# Patient Record
Sex: MALE | Race: WHITE | NOT HISPANIC OR LATINO | Employment: OTHER | URBAN - METROPOLITAN AREA
[De-identification: names, ages, dates, MRNs, and addresses within clinical notes are randomized per-mention and may not be internally consistent; named-entity substitution may affect disease eponyms.]

---

## 2018-01-29 ENCOUNTER — TELEPHONE (OUTPATIENT)
Dept: PAIN MEDICINE | Facility: CLINIC | Age: 73
End: 2018-01-29

## 2018-01-29 NOTE — TELEPHONE ENCOUNTER
Pt called  Stated Dr Tamiko Washington sent over paperwork for him to have injections  Pt not in system and was added  [facility]

## 2018-02-02 NOTE — TELEPHONE ENCOUNTER
Wife called inquiring about setting up an appt  I gave her the office fax number to have the order and office notes sent directly to the office

## 2018-02-05 NOTE — TELEPHONE ENCOUNTER
S/w pt  He is scheduled for 2/20/18 @ 9:45 w/Dr Kathia Tapia  New pt paperwork has been mailed to the pt

## 2018-02-14 ENCOUNTER — TRANSCRIBE ORDERS (OUTPATIENT)
Dept: PAIN MEDICINE | Facility: CLINIC | Age: 73
End: 2018-02-14

## 2018-02-14 DIAGNOSIS — M54.5 LOW BACK PAIN, UNSPECIFIED BACK PAIN LATERALITY, UNSPECIFIED CHRONICITY, WITH SCIATICA PRESENCE UNSPECIFIED: Primary | ICD-10-CM

## 2018-02-21 ENCOUNTER — CONSULT (OUTPATIENT)
Dept: PAIN MEDICINE | Facility: CLINIC | Age: 73
End: 2018-02-21
Payer: MEDICARE

## 2018-02-21 VITALS
TEMPERATURE: 98 F | HEIGHT: 72 IN | DIASTOLIC BLOOD PRESSURE: 80 MMHG | WEIGHT: 243 LBS | SYSTOLIC BLOOD PRESSURE: 132 MMHG | BODY MASS INDEX: 32.91 KG/M2 | HEART RATE: 72 BPM

## 2018-02-21 DIAGNOSIS — M54.50 CHRONIC BILATERAL LOW BACK PAIN WITHOUT SCIATICA: ICD-10-CM

## 2018-02-21 DIAGNOSIS — M48.061 SPINAL STENOSIS OF LUMBAR REGION WITHOUT NEUROGENIC CLAUDICATION: Primary | ICD-10-CM

## 2018-02-21 DIAGNOSIS — G89.29 CHRONIC BILATERAL LOW BACK PAIN WITHOUT SCIATICA: ICD-10-CM

## 2018-02-21 PROCEDURE — 99204 OFFICE O/P NEW MOD 45 MIN: CPT | Performed by: ANESTHESIOLOGY

## 2018-02-21 RX ORDER — LISINOPRIL 10 MG/1
10 TABLET ORAL 2 TIMES DAILY
Refills: 3 | COMMUNITY
Start: 2018-02-13

## 2018-02-21 RX ORDER — HYDROXYCHLOROQUINE SULFATE 200 MG/1
200 TABLET, FILM COATED ORAL 2 TIMES DAILY WITH MEALS
COMMUNITY
End: 2022-05-31 | Stop reason: SDUPTHER

## 2018-02-21 RX ORDER — SULFASALAZINE 500 MG/1
500 TABLET ORAL
COMMUNITY
End: 2022-05-31 | Stop reason: SDUPTHER

## 2018-02-21 RX ORDER — ATORVASTATIN CALCIUM 40 MG/1
40 TABLET, FILM COATED ORAL DAILY
COMMUNITY

## 2018-02-21 RX ORDER — ASPIRIN 81 MG/1
81 TABLET ORAL DAILY
COMMUNITY

## 2018-02-21 RX ORDER — MELOXICAM 15 MG/1
15 TABLET ORAL DAILY
Refills: 0 | COMMUNITY
Start: 2018-02-02

## 2018-02-21 RX ORDER — FEXOFENADINE HCL 180 MG/1
180 TABLET ORAL DAILY
COMMUNITY

## 2018-02-21 RX ORDER — PROPRANOLOL/HYDROCHLOROTHIAZID 40 MG-25MG
1000 TABLET ORAL 2 TIMES DAILY
COMMUNITY

## 2018-02-21 RX ORDER — MULTIVITAMIN
1 TABLET ORAL DAILY
COMMUNITY

## 2018-02-21 NOTE — PROGRESS NOTES
Assessment:  1  Spinal stenosis of lumbar region without neurogenic claudication    2  Chronic bilateral low back pain without sciatica        Plan:  An extremely long and detailed conversation was held with the patient and his wife was in attendance  Patient has longstanding history of low back pain which is significantly worsening right he has a flare up every 3 months that leave him non functioning for approximately 1 week  I thoroughly reviewed his MRI and he does have severe stenosis secondary to disc herniation  We discussed the utility of lumbar epidural steroid injections to address any inflammatory component of his pain  However at this time secondary to the severity of his stenosis I would like him to undergo surgical evaluation prior to epidural steroid injections  Patient is quite comfortable to [facility] and will have him evaluated by Dr Elmer Ordonez  In the interim, if he does have an Neck acute episode of extreme pain I have asked him to give our office a call  The patient has the current Goals: Decreased pain and increased function  The patent has the current Barriers:  Lumbar spinal stenosis    Patient is able to Self-Care    The treatment plan was reviewed with the patient  The patient understands and agrees with the treatment plan   The patient  was counseled regarding instructions for management, risk factor reductions, prognosis, patient and family education, risks and benefits of treatment options and importance of compliance with treatment  My impressions and treatment recommendations were discussed in detail with the patient who verbalized understanding and had no further questions  Discharge instructions were provided  I personally saw and examined the patient and I agree with the above discussed plan of care      Orders Placed This Encounter   Procedures    Ambulatory referral to Orthopedic Surgery     Referral Priority:   Routine     Referral Type:   Consult - AMB     Referral Reason:   Specialty Services Required     Referred to Provider:   Hever Bustos MD     Requested Specialty:   Orthopedic Surgery     Number of Visits Requested:   1     Expiration Date:   2019     New Medications Ordered This Visit   Medications    lisinopril (ZESTRIL) 10 mg tablet     Sig: Take 10 mg by mouth 2 (two) times a day     Refill:  3    meloxicam (MOBIC) 15 mg tablet     Sig: Take 15 mg by mouth daily     Refill:  0    aspirin (ECOTRIN LOW STRENGTH) 81 mg EC tablet     Sig: Take 81 mg by mouth daily    atorvastatin (LIPITOR) 40 mg tablet     Sig: Take 40 mg by mouth daily    Cholecalciferol 2000 units TBDP     Sig: Take by mouth    fexofenadine (ALLEGRA) 180 MG tablet     Sig: Take 180 mg by mouth daily    hydroxychloroquine (PLAQUENIL) 200 mg tablet     Sig: Take 200 mg by mouth 2 (two) times a day with meals    Multiple Vitamin (MULTIVITAMIN) tablet     Sig: Take 1 tablet by mouth daily    sodium chloride (OCEAN) 0 65 % nasal spray     Si spray into each nostril as needed    sulfaSALAzine (AZULFIDINE) 500 mg tablet     Sig: Take by mouth 4 (four) times a day    Turmeric 500 MG CAPS     Sig: Take by mouth       History of Present Illness:    Juju Ley is a 71 y o  male who has a 5 year history of low back pain  Initially an injury at work in 2009  His symptoms are moderate to severe rates as 10/10 on the visual analog scale almost completely interfering with his daily living activities  He denies any weakness of his lower extremities describes the pain as shooting sharp and cutting when it occurs noted that lying down standing in relaxation decreases symptoms will bending, sitting and walking aggravate them    He has undergone physical therapy and chiropractic treatment in the past     I have personally reviewed and/or updated the patient's past medical history, past surgical history, family history, social history, current medications, allergies, and vital signs today  Review of Systems:    Review of Systems   Respiratory: Negative for shortness of breath  Cardiovascular: Negative for chest pain  Gastrointestinal: Negative for constipation, diarrhea, nausea and vomiting  Musculoskeletal: Positive for gait problem  Negative for arthralgias, joint swelling (joint stiffness) and myalgias  Skin: Negative for rash  Neurological: Negative for dizziness, seizures and weakness  All other systems reviewed and are negative  There is no problem list on file for this patient  Past Medical History:   Diagnosis Date    Arthritis     Hypertension        No past surgical history on file  Family History   Problem Relation Age of Onset    Heart disease Family     Diabetes Family        Social History     Occupational History    Not on file  Social History Main Topics    Smoking status: Never Smoker    Smokeless tobacco: Never Used    Alcohol use Yes      Comment: 1-2 per day    Drug use: No    Sexual activity: Not on file       No current outpatient prescriptions on file prior to visit  No current facility-administered medications on file prior to visit  Allergies no known allergies    Physical Exam:    /80   Pulse 72   Temp 98 °F (36 7 °C)   Ht 6' (1 829 m)   Wt 110 kg (243 lb)   BMI 32 96 kg/m²     Constitutional: normal, well developed, well nourished, alert, in no distress and non-toxic and no overt pain behavior  Overweight  Eyes: anicteric  HEENT: grossly intact  Neck: supple, symmetric, trachea midline and no masses   Pulmonary:even and unlabored  Cardiovascular:No edema or pitting edema present  Skin:Normal without rashes or lesions and well hydrated  Psychiatric:Mood and affect appropriate  Neurologic:Cranial Nerves II-XII grossly intact  Musculoskeletal:normal   Inspection:  Normal station and gait  Normal lumbar lordotic curve with no significant scoliosis or spinal step-off    Skin intact without erythema  No gross mass or muscle atrophy  Palpation:  There is no tenderness to palpation overlying the sacroiliac joint as well as the ischial bursa bilateral   No significant tenderness over the greater trochanteric bursa bilaterally  Motor/Strength:  5/5 strength in the bilateral lower limbs  The patient is able to heel and/toe walk  Tandem gait is intact  Reflexes: Deep tendon reflex are 2+ and symmetrical bilateral patella and Achilles  Sensation:   Sensation intact to light touch and pinprick in the bilateral lower limbs  Proprioception is intact at bilateral hallux  Maneuvers: Negative bilateral straight leg raising  Negative Steve's maneuver  Imaging  MRI LUMBAR 1-25-18  Severe central stenosis at L2-3, sedoncdary to a large central midline disc extrusion  Mild central stenosis at L3-4 and L4-5  Mild retrolisthesis at L2-3    I have personally reviewed pertinent films in PACS

## 2021-04-08 ENCOUNTER — TELEPHONE (OUTPATIENT)
Dept: INTERNAL MEDICINE CLINIC | Facility: CLINIC | Age: 76
End: 2021-04-08

## 2021-04-08 DIAGNOSIS — Z23 ENCOUNTER FOR IMMUNIZATION: ICD-10-CM

## 2022-05-31 ENCOUNTER — OFFICE VISIT (OUTPATIENT)
Dept: RHEUMATOLOGY | Facility: CLINIC | Age: 77
End: 2022-05-31

## 2022-05-31 VITALS
WEIGHT: 233 LBS | DIASTOLIC BLOOD PRESSURE: 76 MMHG | SYSTOLIC BLOOD PRESSURE: 147 MMHG | HEIGHT: 72 IN | BODY MASS INDEX: 31.56 KG/M2

## 2022-05-31 DIAGNOSIS — M06.9 RHEUMATOID ARTHRITIS, INVOLVING UNSPECIFIED SITE, UNSPECIFIED WHETHER RHEUMATOID FACTOR PRESENT (HCC): Primary | ICD-10-CM

## 2022-05-31 DIAGNOSIS — Z79.899 HIGH RISK MEDICATION USE: ICD-10-CM

## 2022-05-31 PROCEDURE — 99204 OFFICE O/P NEW MOD 45 MIN: CPT | Performed by: INTERNAL MEDICINE

## 2022-05-31 RX ORDER — CARBOXYMETHYLCELLULOSE SODIUM 5 MG/ML
SOLUTION/ DROPS OPHTHALMIC
COMMUNITY
Start: 2022-05-19

## 2022-05-31 RX ORDER — FLUTICASONE PROPIONATE 50 MCG
SPRAY, SUSPENSION (ML) NASAL
COMMUNITY
Start: 2022-05-19

## 2022-05-31 RX ORDER — AMLODIPINE BESYLATE 5 MG/1
5 TABLET ORAL
COMMUNITY
Start: 2022-05-19

## 2022-05-31 RX ORDER — SULFASALAZINE 500 MG/1
1500 TABLET ORAL 2 TIMES DAILY
Qty: 360 TABLET | Refills: 1 | Status: SHIPPED | OUTPATIENT
Start: 2022-05-31

## 2022-05-31 RX ORDER — HYDROXYCHLOROQUINE SULFATE 200 MG/1
200 TABLET, FILM COATED ORAL 2 TIMES DAILY WITH MEALS
Qty: 180 TABLET | Refills: 1 | Status: SHIPPED | OUTPATIENT
Start: 2022-05-31 | End: 2022-11-27

## 2022-05-31 NOTE — PATIENT INSTRUCTIONS
Continue sulfasalazine 3 tabs twice a day  Continue hydroxychloroquine 1 tab twice a day  Continue regular eye exams  Do labs before next visit    Return to clinic in 6 months

## 2022-05-31 NOTE — PROGRESS NOTES
Assessment and Plan:   Rai Rodriguez is a 77 y o   male who presents as a Rheumatology consult referred by Jeffery Cortes DO to establish care for Rheumatoid arthritis, under control with SSZ 1,500mg po bid and HCQ 200mg po bid; gets regular eye exams  Medications continued  Do labs before next visit  Continue sulfasalazine 3 tabs twice a day  Continue hydroxychloroquine 1 tab twice a day  Continue regular eye exams  Do labs before next visit    Return to clinic in 6 months    Plan:  Diagnoses and all orders for this visit:    Rheumatoid arthritis, involving unspecified site, unspecified whether rheumatoid factor present (HCC)  -     sulfaSALAzine (AZULFIDINE) 500 mg tablet; Take 3 tablets (1,500 mg total) by mouth 2 (two) times a day  -     hydroxychloroquine (PLAQUENIL) 200 mg tablet; Take 1 tablet (200 mg total) by mouth 2 (two) times a day with meals  -     CBC and differential  -     Comprehensive metabolic panel  -     C-reactive protein  -     Sedimentation rate, automated  -     RF Screen w/ Reflex to Titer  -     Cyclic citrul peptide antibody, IgG    High risk medication use  -     CBC and differential  -     Comprehensive metabolic panel    Other orders  -     amLODIPine (NORVASC) 5 mg tablet; Take 5 mg by mouth  -     carboxymethylcellulose (REFRESH PLUS) 0 5 % SOLN; Apply to eye  -     fluticasone (FLONASE) 50 mcg/act nasal spray; into each nostril  High risk medication - Benefits and risks of hydroxychloroquine, including but not limited to retinal toxicity, corneal deposits, gastrointestinal side effects, and headaches were discussed with the patient  The need for a regular eye exam to monitor for ocular toxicity while on this medication was also explained to the patient  Follow-up plan: RTC in 6 months        HPI  Rai Rodriguez is a 77 y o   male who presents as a Rheumatology consult referred by Jeffery Cortes DO to establish care for Rheumatoid arthritis   Transferring care from [facility]  Was diagnosed 18 years ago by a private rheumatologist in [facility]; only prescribed Aleve, helped a little  He then transferred to the [address] [place] [address] in [place]  Original symptoms - had weakness in legs, joint pain and stiffness was worse in the morning  Hand symptoms (especially swelling) started a few months later; worse with activity  Started seeing a fellow at the [address] [place] [address]; started on sulfasalazine (2,000-3,000mg a day) and methotrexate  Took some time to calm things down  Currently on SSZ 3,000mg daily; was on methotrexate for 4 years, doesn't remember why it was stopped  Was never on prednisone  Currently on sulfasalazine, Vit  D3, calcium, atorvastatin, and hydroxychloroquine prescribed by rheumatologist  Chichi Dee his disease is under control  Started taking turmeric bid; has made a big difference  Takes ibuprofen prn (600mg every few months)  Last eye exam was last week; stable  Review of Systems  Review of Systems   Constitutional: Negative for fatigue, fever and unexpected weight change  HENT: Negative for mouth sores  Respiratory: Positive for cough  Negative for shortness of breath  Cardiovascular: Negative for chest pain and leg swelling  Gastrointestinal: Negative for abdominal pain, constipation and diarrhea  Musculoskeletal: Positive for arthralgias, joint swelling and myalgias  Negative for back pain  Skin: Negative for color change and rash  Allergic/Immunologic: Positive for environmental allergies  Neurological: Negative for weakness  Hematological: Negative for adenopathy  Bruises/bleeds easily  Psychiatric/Behavioral: Negative for sleep disturbance  Reviewed and agree      Allergies  No Known Allergies    Home Medications    Current Outpatient Medications:     amLODIPine (NORVASC) 5 mg tablet, Take 5 mg by mouth, Disp: , Rfl:     aspirin (ECOTRIN LOW STRENGTH) 81 mg EC tablet, Take 81 mg by mouth daily, Disp: , Rfl:     atorvastatin (LIPITOR) 40 mg tablet, Take 40 mg by mouth daily, Disp: , Rfl:     carboxymethylcellulose (REFRESH PLUS) 0 5 % SOLN, Apply to eye, Disp: , Rfl:     Cholecalciferol 2000 units TBDP, Take by mouth, Disp: , Rfl:     fexofenadine (ALLEGRA) 180 MG tablet, Take 180 mg by mouth daily, Disp: , Rfl:     fluticasone (FLONASE) 50 mcg/act nasal spray, into each nostril, Disp: , Rfl:     hydroxychloroquine (PLAQUENIL) 200 mg tablet, Take 1 tablet (200 mg total) by mouth 2 (two) times a day with meals, Disp: 180 tablet, Rfl: 1    lisinopril (ZESTRIL) 10 mg tablet, Take 10 mg by mouth 2 (two) times a day, Disp: , Rfl: 3    meloxicam (MOBIC) 15 mg tablet, Take 15 mg by mouth daily, Disp: , Rfl: 0    Multiple Vitamin (MULTIVITAMIN) tablet, Take 1 tablet by mouth daily, Disp: , Rfl:     sodium chloride (OCEAN) 0 65 % nasal spray, 1 spray into each nostril as needed, Disp: , Rfl:     sulfaSALAzine (AZULFIDINE) 500 mg tablet, Take 3 tablets (1,500 mg total) by mouth 2 (two) times a day, Disp: 360 tablet, Rfl: 1    Turmeric 500 MG CAPS, Take 1,000 capsules by mouth 2 (two) times a day, Disp: , Rfl:     Past Medical History  Past Medical History:   Diagnosis Date    Arthritis     Hypertension        Past Surgical History   History reviewed  No pertinent surgical history      Family History    Family History   Problem Relation Age of Onset    Heart disease Family     Diabetes Family    sister - RA      Social History  Occupation: retired; worked in construction, 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   Social History     Substance and Sexual Activity   Alcohol Use Yes    Alcohol/week: 1 0 standard drink    Types: 1 Standard drinks or equivalent per week    Comment: 1-2 per day     Social History     Substance and Sexual Activity   Drug Use No     Social History     Tobacco Use   Smoking Status Former Smoker    Quit date:     Years since quittin 4   Smokeless Tobacco Never Used       Objective:  Vitals:    22 0819   BP: 147/76 Weight: 106 kg (233 lb)   Height: 6' (1 829 m)       Physical Exam  Constitutional:       General: He is not in acute distress  HENT:      Head: Normocephalic and atraumatic  Eyes:      Conjunctiva/sclera: Conjunctivae normal    Cardiovascular:      Rate and Rhythm: Normal rate and regular rhythm  Heart sounds: S1 normal and S2 normal      No friction rub  Pulmonary:      Effort: Pulmonary effort is normal  No respiratory distress  Breath sounds: Normal breath sounds  No wheezing, rhonchi or rales  Musculoskeletal:         General: No swelling or tenderness  Cervical back: Neck supple  Skin:     General: Skin is warm and dry  Coloration: Skin is not pale  Findings: No rash  Neurological:      Mental Status: He is alert  Mental status is at baseline  Psychiatric:         Mood and Affect: Mood normal          Behavior: Behavior normal          Reviewed labs and imaging      Imaging:   MRI Lumbar Spine 1/25/18  Impression:  1  severe centrasl stenosis at L2-3L3, secondary to large cental midline disc extrusion  2  midl central stenosis at L3-L4 and L4-L5  3  mild retrolisthesis at L2-L3    Labs:   Outside labs from 4/2022 - unremarkable CBC and CMP

## 2022-08-11 ENCOUNTER — TELEPHONE (OUTPATIENT)
Dept: INTERNAL MEDICINE CLINIC | Facility: CLINIC | Age: 77
End: 2022-08-11

## 2022-08-15 ENCOUNTER — TELEPHONE (OUTPATIENT)
Dept: INTERNAL MEDICINE CLINIC | Facility: CLINIC | Age: 77
End: 2022-08-15

## 2022-11-28 NOTE — PROGRESS NOTES
Assessment and Plan: Estefany Rodriges is a 69 y o  male who presents for follow-up of his seropositive RA, which continues to be under control with SSZ 1,500mg po bid and HCQ 200mg po bid; gets regular eye exams      Continue sulfasalazine 3 tabs twice a day  Continue hydroxychloroquine 1 tab twice a day  Continue regular eye exams  Do labs before next visit     Return to clinic in 6 months    Plan:  Diagnoses and all orders for this visit:    Rheumatoid arthritis, involving unspecified site, unspecified whether rheumatoid factor present (HCC)  -     sulfaSALAzine (AZULFIDINE) 500 mg tablet; Take 3 tablets (1,500 mg total) by mouth 2 (two) times a day  -     hydroxychloroquine (PLAQUENIL) 200 mg tablet; Take 1 tablet (200 mg total) by mouth in the morning  -     CBC and differential  -     Comprehensive metabolic panel  -     C-reactive protein  -     Sedimentation rate, automated    High risk medication use  -     CBC and differential  -     Comprehensive metabolic panel  High risk medication use - Benefits and risks of hydroxychloroquine, including but not limited to retinal toxicity, corneal deposits, gastrointestinal side effects, and headaches were discussed with the patient  The need for a regular eye exam to monitor for ocular toxicity while on this medication was also explained to the patient  Follow-up plan: RTC in 6 months        Rheumatic Disease Summary  5/31/22: Do labs before next visit  Continue sulfasalazine 3 tabs twice a day  Continue hydroxychloroquine 1 tab twice a day  Continue regular eye exams  Do labs before next visit  HPI  Estefany Rodriges is a 69 y o   male who presents for follow up of seropositive RA  His last flare-up was 2 months ago; took ibuprofen for that  Last eye exam was in May; has some trouble with blurry vision  Has 15-20 minutes of morning stiffness  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, which is beinf further worked up  Swelling in fingers, hands and elbows   Using hammer, holding onto something tightly for an extended period of time, major swelling occurs  In April, couldn't get sulfasalazine for 2 weeks but felt fine when he wasn't on it  Will be out of sulfasalazine tomorrow, so needs refill  Has only been taking 2000 mg daily instead of 3000 mg so that he could make it last longer  Joints aren't hot or red except for during a flare  Stiffness in the morning lasting 15-20 minutes  Just saw cardiology and everything is good on their end  Goes to the [place] doctor in 1 week to go over results from a lung scan done in November  The following portions of the patient's history were reviewed and updated as appropriate: allergies, current medications, past family history, past medical history, past social history, past surgical history and problem list     Review of Systems:   Review of Systems   Constitutional: Negative for fatigue  HENT: Negative for mouth sores  Eyes: Negative for pain  Respiratory: Negative for shortness of breath  Cardiovascular: Negative for leg swelling  Musculoskeletal: Positive for arthralgias  Negative for joint swelling  Skin: Negative for rash  Neurological: Negative for weakness  Hematological: Negative for adenopathy  Psychiatric/Behavioral: Negative for sleep disturbance  Reviewed and agree      Home Medications:    Current Outpatient Medications:   •  atorvastatin (LIPITOR) 40 mg tablet, Take 20 mg by mouth daily, Disp: , Rfl:   •  hydroxychloroquine (PLAQUENIL) 200 mg tablet, Take 1 tablet (200 mg total) by mouth in the morning, Disp: 90 tablet, Rfl: 2  •  sulfaSALAzine (AZULFIDINE) 500 mg tablet, Take 3 tablets (1,500 mg total) by mouth 2 (two) times a day, Disp: 540 tablet, Rfl: 2  •  amLODIPine (NORVASC) 5 mg tablet, Take 5 mg by mouth 2 (two) times a day, Disp: , Rfl:   •  aspirin (ECOTRIN LOW STRENGTH) 81 mg EC tablet, Take 81 mg by mouth daily, Disp: , Rfl:   •  carboxymethylcellulose (REFRESH PLUS) 0 5 % SOLN, Apply to eye, Disp: , Rfl:   •  Cholecalciferol 2000 units TBDP, Take by mouth, Disp: , Rfl:   •  fexofenadine (ALLEGRA) 180 MG tablet, Take 180 mg by mouth daily, Disp: , Rfl:   •  fluticasone (FLONASE) 50 mcg/act nasal spray, into each nostril, Disp: , Rfl:   •  lisinopril (ZESTRIL) 10 mg tablet, Take 20 mg by mouth 2 (two) times a day, Disp: , Rfl: 3  •  Multiple Vitamin (MULTIVITAMIN) tablet, Take 1 tablet by mouth daily, Disp: , Rfl:   •  sodium chloride (OCEAN) 0 65 % nasal spray, 1 spray into each nostril as needed, Disp: , Rfl:   •  Turmeric 500 MG CAPS, Take 1,000 capsules by mouth 2 (two) times a day, Disp: , Rfl:     Objective:    Vitals:    12/01/22 0943   BP: 142/74   Weight: 110 kg (243 lb)   Height: 6' (1 829 m)       Physical Exam  Constitutional:       General: He is not in acute distress  HENT:      Head: Normocephalic and atraumatic  Eyes:      Conjunctiva/sclera: Conjunctivae normal    Cardiovascular:      Rate and Rhythm: Normal rate and regular rhythm  Heart sounds: S1 normal and S2 normal      No friction rub  Pulmonary:      Effort: Pulmonary effort is normal  No respiratory distress  Breath sounds: Normal breath sounds  No wheezing, rhonchi or rales  Musculoskeletal:         General: No tenderness  Cervical back: Neck supple  Skin:     Coloration: Skin is not pale  Neurological:      Mental Status: He is alert  Mental status is at baseline  Psychiatric:         Mood and Affect: Mood normal          Behavior: Behavior normal        Imaging:   No results found  Labs:   No visits with results within 6 Month(s) from this visit  Latest known visit with results is:   No results found for any previous visit

## 2022-12-01 ENCOUNTER — OFFICE VISIT (OUTPATIENT)
Dept: RHEUMATOLOGY | Facility: CLINIC | Age: 77
End: 2022-12-01

## 2022-12-01 VITALS
SYSTOLIC BLOOD PRESSURE: 142 MMHG | WEIGHT: 243 LBS | HEIGHT: 72 IN | BODY MASS INDEX: 32.91 KG/M2 | DIASTOLIC BLOOD PRESSURE: 74 MMHG

## 2022-12-01 DIAGNOSIS — M06.9 RHEUMATOID ARTHRITIS, INVOLVING UNSPECIFIED SITE, UNSPECIFIED WHETHER RHEUMATOID FACTOR PRESENT (HCC): Primary | ICD-10-CM

## 2022-12-01 DIAGNOSIS — Z79.899 HIGH RISK MEDICATION USE: ICD-10-CM

## 2022-12-01 RX ORDER — SULFASALAZINE 500 MG/1
1500 TABLET ORAL 2 TIMES DAILY
Qty: 540 TABLET | Refills: 2 | Status: SHIPPED | OUTPATIENT
Start: 2022-12-01 | End: 2022-12-07 | Stop reason: SDUPTHER

## 2022-12-01 RX ORDER — HYDROXYCHLOROQUINE SULFATE 200 MG/1
200 TABLET, FILM COATED ORAL DAILY
Qty: 90 TABLET | Refills: 2 | Status: SHIPPED | OUTPATIENT
Start: 2022-12-01 | End: 2023-08-28

## 2022-12-01 NOTE — PATIENT INSTRUCTIONS
Continue sulfasalazine 3 tabs twice a day  Continue hydroxychloroquine 1 tab daily for now  Continue regular eye exams  Do labs before next visit     Return to clinic in 6 months

## 2022-12-08 DIAGNOSIS — M06.9 RHEUMATOID ARTHRITIS, INVOLVING UNSPECIFIED SITE, UNSPECIFIED WHETHER RHEUMATOID FACTOR PRESENT (HCC): ICD-10-CM

## 2022-12-08 DIAGNOSIS — M06.9 RHEUMATOID ARTHRITIS, INVOLVING UNSPECIFIED SITE, UNSPECIFIED WHETHER RHEUMATOID FACTOR PRESENT (HCC): Primary | ICD-10-CM

## 2022-12-08 RX ORDER — SULFASALAZINE 500 MG/1
1500 TABLET ORAL 2 TIMES DAILY
Qty: 84 TABLET | Refills: 0 | Status: SHIPPED | OUTPATIENT
Start: 2022-12-08

## 2022-12-08 RX ORDER — SULFASALAZINE 500 MG/1
1500 TABLET ORAL 2 TIMES DAILY
Qty: 540 TABLET | Refills: 2 | Status: SHIPPED | OUTPATIENT
Start: 2022-12-08

## 2022-12-08 RX ORDER — SULFASALAZINE 500 MG/1
1500 TABLET ORAL 2 TIMES DAILY
Qty: 540 TABLET | Refills: 2 | Status: SHIPPED | OUTPATIENT
Start: 2022-12-08 | End: 2022-12-08 | Stop reason: SDUPTHER

## 2022-12-25 DIAGNOSIS — M06.9 RHEUMATOID ARTHRITIS, INVOLVING UNSPECIFIED SITE, UNSPECIFIED WHETHER RHEUMATOID FACTOR PRESENT (HCC): ICD-10-CM

## 2022-12-25 RX ORDER — SULFASALAZINE 500 MG/1
1500 TABLET ORAL 2 TIMES DAILY
Qty: 540 TABLET | Refills: 3 | Status: SHIPPED | OUTPATIENT
Start: 2022-12-25

## 2023-01-30 NOTE — TELEPHONE ENCOUNTER
Caller: Law Blazing at [address]: Jeann[facility] Precise    Reason for call:     Law Bell confirming appointment, confirmed fax too      Call back#:

## 2023-02-03 ENCOUNTER — HOSPITAL ENCOUNTER (OUTPATIENT)
Dept: CT IMAGING | Facility: HOSPITAL | Age: 78
Discharge: HOME | End: 2023-02-03

## 2023-02-03 DIAGNOSIS — D35.00 BENIGN NEOPLASM OF ADRENAL GLAND, UNSPECIFIED LATERALITY: ICD-10-CM

## 2023-02-27 DIAGNOSIS — M06.9 RHEUMATOID ARTHRITIS, INVOLVING UNSPECIFIED SITE, UNSPECIFIED WHETHER RHEUMATOID FACTOR PRESENT (HCC): ICD-10-CM

## 2023-02-27 RX ORDER — HYDROXYCHLOROQUINE SULFATE 200 MG/1
200 TABLET, FILM COATED ORAL DAILY
Qty: 90 TABLET | Refills: 0 | Status: SHIPPED | OUTPATIENT
Start: 2023-02-27 | End: 2023-11-24

## 2023-05-30 DIAGNOSIS — M06.9 RHEUMATOID ARTHRITIS, INVOLVING UNSPECIFIED SITE, UNSPECIFIED WHETHER RHEUMATOID FACTOR PRESENT (HCC): ICD-10-CM

## 2023-05-30 RX ORDER — HYDROXYCHLOROQUINE SULFATE 200 MG/1
200 TABLET, FILM COATED ORAL DAILY
Qty: 90 TABLET | Refills: 0 | Status: SHIPPED | OUTPATIENT
Start: 2023-05-30 | End: 2024-02-24

## 2023-07-11 ENCOUNTER — OFFICE VISIT (OUTPATIENT)
Dept: RHEUMATOLOGY | Facility: CLINIC | Age: 78
End: 2023-07-11
Payer: MEDICARE

## 2023-07-11 VITALS
DIASTOLIC BLOOD PRESSURE: 68 MMHG | HEIGHT: 72 IN | SYSTOLIC BLOOD PRESSURE: 128 MMHG | BODY MASS INDEX: 31.83 KG/M2 | WEIGHT: 235 LBS

## 2023-07-11 DIAGNOSIS — M06.9 RHEUMATOID ARTHRITIS, INVOLVING UNSPECIFIED SITE, UNSPECIFIED WHETHER RHEUMATOID FACTOR PRESENT (HCC): Primary | ICD-10-CM

## 2023-07-11 DIAGNOSIS — Z79.899 HIGH RISK MEDICATION USE: ICD-10-CM

## 2023-07-11 PROCEDURE — 99214 OFFICE O/P EST MOD 30 MIN: CPT | Performed by: INTERNAL MEDICINE

## 2023-07-11 RX ORDER — SULFASALAZINE 500 MG/1
1500 TABLET ORAL 2 TIMES DAILY
Qty: 540 TABLET | Refills: 3 | Status: SHIPPED | OUTPATIENT
Start: 2023-07-11

## 2023-07-11 RX ORDER — HYDROXYCHLOROQUINE SULFATE 200 MG/1
200 TABLET, FILM COATED ORAL DAILY
Qty: 90 TABLET | Refills: 3 | Status: SHIPPED | OUTPATIENT
Start: 2023-07-11 | End: 2024-07-05

## 2023-07-11 NOTE — PATIENT INSTRUCTIONS
Continue sulfasalazine 3 tabs twice a day  Continue hydroxychloroquine 1 tab daily  Continue regular eye exams  Do labs before next visit     Return to clinic in 6 months

## 2023-07-11 NOTE — PROGRESS NOTES
Assessment and Plan: Britany Bacon is a 69 y.o. male who presents for follow-up of his seropositive RA, which continues to be under control with SSZ 1,500mg po bid and HCQ 200mg po bid; gets regular eye exams. Had recent echocardiogram and stress test, which were unremarkable.     Continue sulfasalazine 3 tabs twice a day  Continue hydroxychloroquine 1 tab daily  Continue regular eye exams  Do labs before next visit     Return to clinic in 6 months    Plan:  Diagnoses and all orders for this visit:    Rheumatoid arthritis, involving unspecified site, unspecified whether rheumatoid factor present (HCC)  -     sulfaSALAzine (AZULFIDINE) 500 mg tablet; Take 3 tablets (1,500 mg total) by mouth 2 (two) times a day  -     hydroxychloroquine (PLAQUENIL) 200 mg tablet; Take 1 tablet (200 mg total) by mouth in the morning  -     CBC and differential  -     Comprehensive metabolic panel  -     C-reactive protein  -     Sedimentation rate, automated    High risk medication use  -     CBC and differential  -     Comprehensive metabolic panel    High risk medication use - Benefits and risks of hydroxychloroquine, including but not limited to retinal toxicity, corneal deposits, gastrointestinal side effects, and headaches were discussed with the patient. The need for a regular eye exam to monitor for ocular toxicity while on this medication was also explained to the patient. Follow-up plan: RTC in 6 months        Rheumatic Disease Summary  5/31/22: Do labs before next visit. Continue sulfasalazine 3 tabs twice a day. Continue hydroxychloroquine 1 tab twice a day. Continue regular eye exams  Do labs before next visit. 12/1/22: Continue sulfasalazine 3 tabs twice a day  Continue hydroxychloroquine 1 tab daily  Continue regular eye exams  Do labs before next visit    HPI  Britany Bacon is a 69 y.o.  male who presents for follow up of seropositive RA. Last visit was 12/1/22. Has been doing well overall.      The following portions of the patient's history were reviewed and updated as appropriate: allergies, current medications, past family history, past medical history, past social history, past surgical history and problem list.    Review of Systems:   Review of Systems   Constitutional: Negative for fatigue. HENT: Negative for mouth sores. Eyes: Negative for pain. Respiratory: Negative for shortness of breath. Cardiovascular: Negative for leg swelling. Musculoskeletal: Positive for arthralgias. Negative for joint swelling. Skin: Negative for rash. Neurological: Negative for weakness. Hematological: Negative for adenopathy. Psychiatric/Behavioral: Negative for sleep disturbance. Reviewed and agree.     Home Medications:    Current Outpatient Medications:   •  hydroxychloroquine (PLAQUENIL) 200 mg tablet, Take 1 tablet (200 mg total) by mouth in the morning, Disp: 90 tablet, Rfl: 3  •  sulfaSALAzine (AZULFIDINE) 500 mg tablet, Take 3 tablets (1,500 mg total) by mouth 2 (two) times a day, Disp: 540 tablet, Rfl: 3  •  amLODIPine (NORVASC) 5 mg tablet, Take 5 mg by mouth 2 (two) times a day, Disp: , Rfl:   •  aspirin (ECOTRIN LOW STRENGTH) 81 mg EC tablet, Take 81 mg by mouth daily, Disp: , Rfl:   •  atorvastatin (LIPITOR) 40 mg tablet, Take 20 mg by mouth daily, Disp: , Rfl:   •  carboxymethylcellulose (REFRESH PLUS) 0.5 % SOLN, Apply to eye, Disp: , Rfl:   •  Cholecalciferol 2000 units TBDP, Take by mouth, Disp: , Rfl:   •  fexofenadine (ALLEGRA) 180 MG tablet, Take 180 mg by mouth daily, Disp: , Rfl:   •  fluticasone (FLONASE) 50 mcg/act nasal spray, into each nostril, Disp: , Rfl:   •  lisinopril (ZESTRIL) 10 mg tablet, Take 20 mg by mouth 2 (two) times a day, Disp: , Rfl: 3  •  Multiple Vitamin (MULTIVITAMIN) tablet, Take 1 tablet by mouth daily, Disp: , Rfl:   •  sodium chloride (OCEAN) 0.65 % nasal spray, 1 spray into each nostril as needed, Disp: , Rfl:   •  Turmeric 500 MG CAPS, Take 1,000 capsules by mouth 2 (two) times a day, Disp: , Rfl:     Objective:    Vitals:    07/11/23 1043   BP: 128/68   Weight: 107 kg (235 lb)   Height: 6' (1.829 m)       Physical Exam  Constitutional:       General: He is not in acute distress. HENT:      Head: Normocephalic and atraumatic. Eyes:      Conjunctiva/sclera: Conjunctivae normal.   Cardiovascular:      Rate and Rhythm: Normal rate and regular rhythm. Heart sounds: S1 normal and S2 normal.      No friction rub. Pulmonary:      Effort: Pulmonary effort is normal. No respiratory distress. Breath sounds: Normal breath sounds. No wheezing, rhonchi or rales. Musculoskeletal:         General: No tenderness. Cervical back: Neck supple. Skin:     Coloration: Skin is not pale. Neurological:      Mental Status: He is alert. Mental status is at baseline. Psychiatric:         Mood and Affect: Mood normal.         Behavior: Behavior normal.       Imaging:   MRI abdomen with and without contrast 4/11/23 at [address]  1. 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  Mild signal dropout within the liver suggesting steatosis. Labs:   No visits with results within 6 Month(s) from this visit. Latest known visit with results is:   No results found for any previous visit.

## 2023-07-27 ENCOUNTER — TELEPHONE (OUTPATIENT)
Dept: RHEUMATOLOGY | Facility: CLINIC | Age: 78
End: 2023-07-27

## 2023-07-27 NOTE — TELEPHONE ENCOUNTER
[facility]: [facility]    Doctor: Georgina Goldstein    Reason for call: Wanted to verify patient came in for scheduled appt and ask for office notes to be faxed over .  Notes faxed     Call back#: [phone]

## 2023-10-30 ENCOUNTER — OFFICE VISIT (OUTPATIENT)
Dept: INTERNAL MEDICINE CLINIC | Facility: CLINIC | Age: 78
End: 2023-10-30
Payer: MEDICARE

## 2023-10-30 VITALS
WEIGHT: 244 LBS | TEMPERATURE: 97.8 F | DIASTOLIC BLOOD PRESSURE: 80 MMHG | RESPIRATION RATE: 12 BRPM | SYSTOLIC BLOOD PRESSURE: 130 MMHG | BODY MASS INDEX: 33.05 KG/M2 | HEIGHT: 72 IN | OXYGEN SATURATION: 98 % | HEART RATE: 97 BPM

## 2023-10-30 DIAGNOSIS — D36.7 DERMOID CYST OF NECK: Primary | ICD-10-CM

## 2023-10-30 PROBLEM — K63.5 POLYP OF COLON: Status: ACTIVE | Noted: 2023-10-30

## 2023-10-30 PROBLEM — M06.9 RHEUMATOID ARTHRITIS (HCC): Status: ACTIVE | Noted: 2023-10-30

## 2023-10-30 PROBLEM — G47.33 OBSTRUCTIVE SLEEP APNEA OF ADULT: Status: ACTIVE | Noted: 2023-10-30

## 2023-10-30 PROBLEM — M19.90 OSTEOARTHRITIS: Status: ACTIVE | Noted: 2023-10-30

## 2023-10-30 PROBLEM — D17.9 LIPOMA: Status: ACTIVE | Noted: 2023-10-30

## 2023-10-30 PROBLEM — Z72.0 TOBACCO USER: Status: ACTIVE | Noted: 2023-10-30

## 2023-10-30 PROBLEM — Z79.899 ENCOUNTER FOR LONG-TERM (CURRENT) USE OF OTHER MEDICATIONS: Status: ACTIVE | Noted: 2023-10-30

## 2023-10-30 PROBLEM — R00.1 BRADYCARDIA: Status: ACTIVE | Noted: 2023-10-30

## 2023-10-30 PROBLEM — D35.00 BENIGN NEOPLASM OF UNSPECIFIED ADRENAL GLAND: Status: ACTIVE | Noted: 2023-10-30

## 2023-10-30 PROBLEM — M48.061 SPINAL STENOSIS OF LUMBAR REGION: Status: ACTIVE | Noted: 2023-10-30

## 2023-10-30 PROBLEM — F43.10 POSTTRAUMATIC STRESS DISORDER: Status: ACTIVE | Noted: 2023-10-30

## 2023-10-30 PROBLEM — Z86.16 PERSONAL HISTORY OF COVID-19: Status: ACTIVE | Noted: 2023-10-30

## 2023-10-30 PROBLEM — E78.5 HYPERLIPIDEMIA: Status: ACTIVE | Noted: 2023-10-30

## 2023-10-30 PROBLEM — I10 ESSENTIAL (PRIMARY) HYPERTENSION: Status: ACTIVE | Noted: 2023-10-30

## 2023-10-30 PROCEDURE — 99213 OFFICE O/P EST LOW 20 MIN: CPT | Performed by: INTERNAL MEDICINE

## 2023-10-30 RX ORDER — VALSARTAN 320 MG/1
TABLET ORAL
COMMUNITY
Start: 2023-06-06

## 2023-10-30 RX ORDER — ASPIRIN 81 MG/1
81 TABLET ORAL DAILY
COMMUNITY

## 2023-10-30 NOTE — PROGRESS NOTES
BMI Counseling: There is no height or weight on file to calculate BMI. The BMI is above normal. Nutrition recommendations include decreasing portion sizes. Exercise recommendations include exercising 3-5 times per week. Patient referred to PCP. Rationale for BMI follow-up plan is due to patient being overweight or obese. Depression Screening and Follow-up Plan: Patient was screened for depression during today's encounter. They screened negative with a PHQ-2 score of 0. Assessment/Plan:    No problem-specific Assessment & Plan notes found for this encounter. There are no diagnoses linked to this encounter. Subjective:      Patient ID: Sonia Alfred is a 69 y.o. male. New pt [facility] remains primary not me    Sees cardiology a/t--rheum--ent--derm    RFV=c/o lump 30 years-on back=va  said-sac cyst          The following portions of the patient's history were reviewed and updated as appropriate: allergies, current medications, past family history, past medical history, past social history, past surgical history, and problem list.    Review of Systems   Constitutional:  Negative for activity change, appetite change, chills, diaphoresis, fatigue and fever. HENT:  Negative for congestion, facial swelling, hearing loss, mouth sores, rhinorrhea, sore throat, trouble swallowing and voice change. Eyes:  Negative for photophobia and pain. Respiratory:  Negative for apnea, cough, chest tightness, shortness of breath and stridor. Cardiovascular:  Negative for chest pain, palpitations and leg swelling. Gastrointestinal:  Negative for abdominal distention, abdominal pain, blood in stool and constipation. Endocrine: Negative for cold intolerance and heat intolerance. Genitourinary:  Negative for difficulty urinating, dysuria, flank pain, genital sores, hematuria and urgency. Musculoskeletal:  Negative for arthralgias, back pain, gait problem, joint swelling and myalgias.    Skin: Negative for rash and wound. Allergic/Immunologic: Negative for environmental allergies, food allergies and immunocompromised state. Neurological:  Negative for dizziness, tremors, seizures, syncope, facial asymmetry, speech difficulty, weakness, light-headedness, numbness and headaches. Hematological:  Negative for adenopathy. Does not bruise/bleed easily. Psychiatric/Behavioral:  Negative for agitation, behavioral problems, hallucinations, self-injury, sleep disturbance and suicidal ideas. Objective: There were no vitals taken for this visit. Physical Exam  Constitutional:       General: He is not in acute distress. Appearance: Normal appearance. He is not ill-appearing, toxic-appearing or diaphoretic. HENT:      Head: Normocephalic. Right Ear: Tympanic membrane and external ear normal.      Left Ear: Tympanic membrane and external ear normal.      Mouth/Throat:      Mouth: Mucous membranes are moist.      Pharynx: No oropharyngeal exudate or posterior oropharyngeal erythema. Eyes:      General: No scleral icterus. Right eye: No discharge. Left eye: No discharge. Neck:      Vascular: No carotid bruit. Cardiovascular:      Rate and Rhythm: Normal rate and regular rhythm. Pulses: Normal pulses. Heart sounds: Normal heart sounds. No murmur heard. No friction rub. No gallop. Pulmonary:      Effort: No respiratory distress. Breath sounds: No stridor. No wheezing or rhonchi. Abdominal:      General: There is no distension. Palpations: Abdomen is soft. There is no mass. Tenderness: There is no abdominal tenderness. There is no right CVA tenderness, left CVA tenderness, guarding or rebound. Hernia: No hernia is present. Genitourinary:     Rectum: Guaiac result negative. Musculoskeletal:         General: No swelling, tenderness, deformity or signs of injury. Cervical back: No rigidity. No muscular tenderness. Right lower leg: No edema. Left lower leg: No edema. Lymphadenopathy:      Cervical: No cervical adenopathy. Skin:     Capillary Refill: Capillary refill takes less than 2 seconds. Coloration: Skin is not jaundiced. Findings: No bruising, erythema or lesion. Neurological:      Mental Status: He is alert. Cranial Nerves: No cranial nerve deficit. Sensory: No sensory deficit. Motor: No weakness.       Coordination: Coordination normal.      Gait: Gait normal.      Deep Tendon Reflexes: Reflexes normal.   Psychiatric:         Mood and Affect: Mood normal.

## 2023-11-14 ENCOUNTER — HOSPITAL ENCOUNTER (OUTPATIENT)
Dept: CT IMAGING | Facility: HOSPITAL | Age: 78
Discharge: HOME | End: 2023-11-14
Payer: COMMERCIAL

## 2023-11-14 DIAGNOSIS — R91.1 SOLITARY PULMONARY NODULE: ICD-10-CM

## 2023-11-14 PROCEDURE — 71250 CT THORAX DX C-: CPT

## 2023-11-14 PROCEDURE — G1004 CDSM NDSC: HCPCS

## 2023-11-27 ENCOUNTER — TELEPHONE (OUTPATIENT)
Dept: INTERNAL MEDICINE CLINIC | Facility: CLINIC | Age: 78
End: 2023-11-27

## 2024-03-16 PROBLEM — Z77.29 EXPOSURE TO POTENTIALLY HAZARDOUS SUBSTANCE: Status: ACTIVE | Noted: 2024-03-16

## 2024-03-16 PROBLEM — Z01.818 PREOP EXAMINATION: Status: ACTIVE | Noted: 2024-03-16

## 2024-03-16 NOTE — PROGRESS NOTES
Assessment/Plan:    No problem-specific Assessment & Plan notes found for this encounter.       Diagnoses and all orders for this visit:    Preop examination    Essential (primary) hypertension    Obstructive sleep apnea of adult    Bradycardia    Age-related nuclear cataract of both eyes    Other orders  -     Diclofenac Sodium (VOLTAREN) 1 %; APPLY 2GM TO UPPER EXTREMITIES TOPICALLY FOUR TIMES A DAY AS NEEDED FOR PAIN AND INFLAMMATION **USE DOSING CARD** (DO NOT EXCEED 16 GRAMS DAILY TO ANY AFFECTED JOINT OF THE LOWER EXTREMITIES. DO NOT EXCEED 8 GRAMS DAILY TO ANY AFFECTED JOINT OF THE UPPER EXTREMITIES. DO NOT EXCEED A TOTAL DOSE OF 32 GRAMS DAILY OVER ALL JOINTS)          Subjective:      Patient ID: Girma Treviño is a 78 y.o. male.    PRE OP EYE  EKG  1/3/24==[facility]= PAC SB 44  1DEGREE AV BLOCK= CARDIOLOGIST OV WITH EKG NOTED=OK FOR SURGERY  Lid  repair  Cataract  done 2023          The following portions of the patient's history were reviewed and updated as appropriate: allergies, current medications, past family history, past medical history, past social history, past surgical history, and problem list.    Review of Systems   Constitutional:  Negative for activity change and fatigue.   HENT:  Negative for ear discharge, ear pain, rhinorrhea and sore throat.    Eyes:  Negative for pain and visual disturbance.   Respiratory:  Negative for cough and shortness of breath.    Cardiovascular:  Negative for chest pain and leg swelling.   Gastrointestinal:  Negative for abdominal pain, constipation and diarrhea.   Endocrine: Negative for cold intolerance and polyuria.   Genitourinary:  Negative for flank pain and hematuria.   Musculoskeletal:  Negative for back pain and joint swelling.   Skin:  Negative for pallor and wound.   Neurological:  Negative for dizziness, seizures and speech difficulty.   Psychiatric/Behavioral:  Negative for confusion and hallucinations.          Objective:      There were no vitals taken  for this visit.         Physical Exam  Vitals and nursing note reviewed.   Constitutional:       General: He is not in acute distress.     Appearance: Normal appearance. He is not ill-appearing.   HENT:      Head: Normocephalic.      Right Ear: External ear normal. There is no impacted cerumen.      Left Ear: External ear normal. There is no impacted cerumen.      Nose: No congestion or rhinorrhea.      Mouth/Throat:      Pharynx: No posterior oropharyngeal erythema.   Eyes:      General: No scleral icterus.        Right eye: No discharge.         Left eye: No discharge.   Neck:      Vascular: No carotid bruit.   Cardiovascular:      Rate and Rhythm: Normal rate and regular rhythm.      Heart sounds: Normal heart sounds. No murmur heard.     No friction rub. No gallop.   Pulmonary:      Breath sounds: No wheezing or rhonchi.   Abdominal:      General: There is no distension.      Tenderness: There is no abdominal tenderness. There is no guarding.   Musculoskeletal:         General: No swelling.      Cervical back: No rigidity.      Right lower leg: No edema.      Left lower leg: No edema.   Lymphadenopathy:      Cervical: No cervical adenopathy.   Skin:     Coloration: Skin is not jaundiced.   Neurological:      Mental Status: He is alert.      Cranial Nerves: No cranial nerve deficit.      Motor: No weakness.      Coordination: Coordination normal.   Psychiatric:         Mood and Affect: Mood normal.

## 2024-03-18 ENCOUNTER — OFFICE VISIT (OUTPATIENT)
Dept: INTERNAL MEDICINE CLINIC | Facility: CLINIC | Age: 79
End: 2024-03-18
Payer: MEDICARE

## 2024-03-18 VITALS
SYSTOLIC BLOOD PRESSURE: 130 MMHG | DIASTOLIC BLOOD PRESSURE: 80 MMHG | HEART RATE: 78 BPM | WEIGHT: 243 LBS | BODY MASS INDEX: 32.91 KG/M2 | HEIGHT: 72 IN | TEMPERATURE: 97.8 F | RESPIRATION RATE: 12 BRPM

## 2024-03-18 DIAGNOSIS — Z01.818 PREOP EXAMINATION: Primary | ICD-10-CM

## 2024-03-18 DIAGNOSIS — R00.1 BRADYCARDIA: ICD-10-CM

## 2024-03-18 DIAGNOSIS — G47.33 OBSTRUCTIVE SLEEP APNEA OF ADULT: ICD-10-CM

## 2024-03-18 DIAGNOSIS — H25.13 AGE-RELATED NUCLEAR CATARACT OF BOTH EYES: ICD-10-CM

## 2024-03-18 DIAGNOSIS — I10 ESSENTIAL (PRIMARY) HYPERTENSION: ICD-10-CM

## 2024-03-18 PROCEDURE — 99213 OFFICE O/P EST LOW 20 MIN: CPT | Performed by: INTERNAL MEDICINE

## 2024-03-18 PROCEDURE — G2211 COMPLEX E/M VISIT ADD ON: HCPCS | Performed by: INTERNAL MEDICINE

## 2024-04-16 ENCOUNTER — OFFICE VISIT (OUTPATIENT)
Dept: INTERNAL MEDICINE CLINIC | Facility: CLINIC | Age: 79
End: 2024-04-16
Payer: MEDICARE

## 2024-04-16 VITALS
HEART RATE: 70 BPM | TEMPERATURE: 97 F | OXYGEN SATURATION: 94 % | HEIGHT: 72 IN | RESPIRATION RATE: 12 BRPM | WEIGHT: 246 LBS | BODY MASS INDEX: 33.32 KG/M2 | DIASTOLIC BLOOD PRESSURE: 80 MMHG | SYSTOLIC BLOOD PRESSURE: 140 MMHG

## 2024-04-16 DIAGNOSIS — E83.10 DISORDER OF IRON METABOLISM, UNSPECIFIED: ICD-10-CM

## 2024-04-16 DIAGNOSIS — M05.711 RHEUMATOID ARTHRITIS INVOLVING RIGHT SHOULDER WITH POSITIVE RHEUMATOID FACTOR (HCC): ICD-10-CM

## 2024-04-16 DIAGNOSIS — Z86.010 PERSONAL HISTORY OF COLONIC POLYPS: ICD-10-CM

## 2024-04-16 DIAGNOSIS — I10 ESSENTIAL (PRIMARY) HYPERTENSION: ICD-10-CM

## 2024-04-16 DIAGNOSIS — E78.00 PURE HYPERCHOLESTEROLEMIA: ICD-10-CM

## 2024-04-16 DIAGNOSIS — M48.061 SPINAL STENOSIS OF LUMBAR REGION WITHOUT NEUROGENIC CLAUDICATION: ICD-10-CM

## 2024-04-16 DIAGNOSIS — F43.10 POSTTRAUMATIC STRESS DISORDER: ICD-10-CM

## 2024-04-16 DIAGNOSIS — K63.5 POLYP OF ASCENDING COLON, UNSPECIFIED TYPE: ICD-10-CM

## 2024-04-16 DIAGNOSIS — Z11.59 NEED FOR HEPATITIS C SCREENING TEST: ICD-10-CM

## 2024-04-16 DIAGNOSIS — M15.3 POST-TRAUMATIC OSTEOARTHRITIS OF MULTIPLE JOINTS: ICD-10-CM

## 2024-04-16 DIAGNOSIS — D35.00 BENIGN NEOPLASM OF ADRENAL GLAND, UNSPECIFIED LATERALITY: ICD-10-CM

## 2024-04-16 DIAGNOSIS — Z12.5 SCREENING FOR PROSTATE CANCER: ICD-10-CM

## 2024-04-16 DIAGNOSIS — Z00.00 MEDICARE ANNUAL WELLNESS VISIT, SUBSEQUENT: Primary | ICD-10-CM

## 2024-04-16 DIAGNOSIS — G47.33 OBSTRUCTIVE SLEEP APNEA OF ADULT: ICD-10-CM

## 2024-04-16 DIAGNOSIS — Z13.1 SCREENING FOR DIABETES MELLITUS: ICD-10-CM

## 2024-04-16 PROCEDURE — G0438 PPPS, INITIAL VISIT: HCPCS | Performed by: INTERNAL MEDICINE

## 2024-04-16 RX ORDER — ERYTHROMYCIN 5 MG/G
OINTMENT OPHTHALMIC
COMMUNITY
Start: 2024-03-18

## 2024-04-16 NOTE — PATIENT INSTRUCTIONS
Medicare Preventive Visit Patient Instructions  Thank you for completing your Welcome to Medicare Visit or Medicare Annual Wellness Visit today. Your next wellness visit will be due in one year (4/17/2025).  The screening/preventive services that you may require over the next 5-10 years are detailed below. Some tests may not apply to you based off risk factors and/or age. Screening tests ordered at today's visit but not completed yet may show as past due. Also, please note that scanned in results may not display below.  Preventive Screenings:  Service Recommendations Previous Testing/Comments   Colorectal Cancer Screening  Colonoscopy    Fecal Occult Blood Test (FOBT)/Fecal Immunochemical Test (FIT)  Fecal DNA/Cologuard Test  Flexible Sigmoidoscopy Age: 45-75 years old   Colonoscopy: every 10 years (May be performed more frequently if at higher risk)  OR  FOBT/FIT: every 1 year  OR  Cologuard: every 3 years  OR  Sigmoidoscopy: every 5 years  Screening may be recommended earlier than age 45 if at higher risk for colorectal cancer. Also, an individualized decision between you and your healthcare provider will decide whether screening between the ages of 76-85 would be appropriate. Colonoscopy: 12/09/2021  FOBT/FIT: Not on file  Cologuard: Not on file  Sigmoidoscopy: Not on file          Prostate Cancer Screening Individualized decision between patient and health care provider in men between ages of 55-69   Medicare will cover every 12 months beginning on the day after your 50th birthday PSA: No results in last 5 years           Hepatitis C Screening Once for adults born between 1945 and 1965  More frequently in patients at high risk for Hepatitis C Hep C Antibody: Not on file        Diabetes Screening 1-2 times per year if you're at risk for diabetes or have pre-diabetes Fasting glucose: No results in last 5 years (No results in last 5 years)  A1C: No results in last 5 years (No results in last 5 years)       Cholesterol Screening Once every 5 years if you don't have a lipid disorder. May order more often based on risk factors. Lipid panel: Not on file         Other Preventive Screenings Covered by Medicare:  Abdominal Aortic Aneurysm (AAA) Screening: covered once if your at risk. You're considered to be at risk if you have a family history of AAA or a male between the age of 65-75 who smoking at least 100 cigarettes in your lifetime.  Lung Cancer Screening: covers low dose CT scan once per year if you meet all of the following conditions: (1) Age 55-77; (2) No signs or symptoms of lung cancer; (3) Current smoker or have quit smoking within the last 15 years; (4) You have a tobacco smoking history of at least 20 pack years (packs per day x number of years you smoked); (5) You get a written order from a healthcare provider.  Glaucoma Screening: covered annually if you're considered high risk: (1) You have diabetes OR (2) Family history of glaucoma OR (3)  aged 50 and older OR (4)  American aged 65 and older  Osteoporosis Screening: covered every 2 years if you meet one of the following conditions: (1) Have a vertebral abnormality; (2) On glucocorticoid therapy for more than 3 months; (3) Have primary hyperparathyroidism; (4) On osteoporosis medications and need to assess response to drug therapy.  HIV Screening: covered annually if you're between the age of 15-65. Also covered annually if you are younger than 15 and older than 65 with risk factors for HIV infection. For pregnant patients, it is covered up to 3 times per pregnancy.    Immunizations:  Immunization Recommendations   Influenza Vaccine Annual influenza vaccination during flu season is recommended for all persons aged >= 6 months who do not have contraindications   Pneumococcal Vaccine   * Pneumococcal conjugate vaccine = PCV13 (Prevnar 13), PCV15 (Vaxneuvance), PCV20 (Prevnar 20)  * Pneumococcal polysaccharide vaccine = PPSV23  (Pneumovax) Adults 19-65 yo with certain risk factors or if 65+ yo  If never received any pneumonia vaccine: recommend Prevnar 20 (PCV20)  Give PCV20 if previously received 1 dose of PCV13 or PPSV23   Hepatitis B Vaccine 3 dose series if at intermediate or high risk (ex: diabetes, end stage renal disease, liver disease)   Respiratory syncytial virus (RSV) Vaccine - COVERED BY MEDICARE PART D  * RSVPreF3 (Arexvy) CDC recommends that adults 60 years of age and older may receive a single dose of RSV vaccine using shared clinical decision-making (SCDM)   Tetanus (Td) Vaccine - COST NOT COVERED BY MEDICARE PART B Following completion of primary series, a booster dose should be given every 10 years to maintain immunity against tetanus. Td may also be given as tetanus wound prophylaxis.   Tdap Vaccine - COST NOT COVERED BY MEDICARE PART B Recommended at least once for all adults. For pregnant patients, recommended with each pregnancy.   Shingles Vaccine (Shingrix) - COST NOT COVERED BY MEDICARE PART B  2 shot series recommended in those 19 years and older who have or will have weakened immune systems or those 50 years and older     Health Maintenance Due:      Topic Date Due   • Hepatitis C Screening  Never done   • Colorectal Cancer Screening  Discontinued     Immunizations Due:      Topic Date Due   • COVID-19 Vaccine (7 - 2023-24 season) 01/21/2024     Advance Directives   What are advance directives?  Advance directives are legal documents that state your wishes and plans for medical care. These plans are made ahead of time in case you lose your ability to make decisions for yourself. Advance directives can apply to any medical decision, such as the treatments you want, and if you want to donate organs.   What are the types of advance directives?  There are many types of advance directives, and each state has rules about how to use them. You may choose a combination of any of the following:  Living will:  This is a  written record of the treatment you want. You can also choose which treatments you do not want, which to limit, and which to stop at a certain time. This includes surgery, medicine, IV fluid, and tube feedings.   Durable power of  for healthcare (DPAHC):  This is a written record that states who you want to make healthcare choices for you when you are unable to make them for yourself. This person, called a proxy, is usually a family member or a friend. You may choose more than 1 proxy.  Do not resuscitate (DNR) order:  A DNR order is used in case your heart stops beating or you stop breathing. It is a request not to have certain forms of treatment, such as CPR. A DNR order may be included in other types of advance directives.  Medical directive:  This covers the care that you want if you are in a coma, near death, or unable to make decisions for yourself. You can list the treatments you want for each condition. Treatment may include pain medicine, surgery, blood transfusions, dialysis, IV or tube feedings, and a ventilator (breathing machine).  Values history:  This document has questions about your views, beliefs, and how you feel and think about life. This information can help others choose the care that you would choose.  Why are advance directives important?  An advance directive helps you control your care. Although spoken wishes may be used, it is better to have your wishes written down. Spoken wishes can be misunderstood, or not followed. Treatments may be given even if you do not want them. An advance directive may make it easier for your family to make difficult choices about your care.   Weight Management   Why it is important to manage your weight:  Being overweight increases your risk of health conditions such as heart disease, high blood pressure, type 2 diabetes, and certain types of cancer. It can also increase your risk for osteoarthritis, sleep apnea, and other respiratory problems. Aim for  a slow, steady weight loss. Even a small amount of weight loss can lower your risk of health problems.  How to lose weight safely:  A safe and healthy way to lose weight is to eat fewer calories and get regular exercise. You can lose up about 1 pound a week by decreasing the number of calories you eat by 500 calories each day.   Healthy meal plan for weight management:  A healthy meal plan includes a variety of foods, contains fewer calories, and helps you stay healthy. A healthy meal plan includes the following:  Eat whole-grain foods more often.  A healthy meal plan should contain fiber. Fiber is the part of grains, fruits, and vegetables that is not broken down by your body. Whole-grain foods are healthy and provide extra fiber in your diet. Some examples of whole-grain foods are whole-wheat breads and pastas, oatmeal, brown rice, and bulgur.  Eat a variety of vegetables every day.  Include dark, leafy greens such as spinach, kale, rosalee greens, and mustard greens. Eat yellow and orange vegetables such as carrots, sweet potatoes, and winter squash.   Eat a variety of fruits every day.  Choose fresh or canned fruit (canned in its own juice or light syrup) instead of juice. Fruit juice has very little or no fiber.  Eat low-fat dairy foods.  Drink fat-free (skim) milk or 1% milk. Eat fat-free yogurt and low-fat cottage cheese. Try low-fat cheeses such as mozzarella and other reduced-fat cheeses.  Choose meat and other protein foods that are low in fat.  Choose beans or other legumes such as split peas or lentils. Choose fish, skinless poultry (chicken or turkey), or lean cuts of red meat (beef or pork). Before you cook meat or poultry, cut off any visible fat.   Use less fat and oil.  Try baking foods instead of frying them. Add less fat, such as margarine, sour cream, regular salad dressing and mayonnaise to foods. Eat fewer high-fat foods. Some examples of high-fat foods include french fries, doughnuts, ice  cream, and cakes.  Eat fewer sweets.  Limit foods and drinks that are high in sugar. This includes candy, cookies, regular soda, and sweetened drinks.  Exercise:  Exercise at least 30 minutes per day on most days of the week. Some examples of exercise include walking, biking, dancing, and swimming. You can also fit in more physical activity by taking the stairs instead of the elevator or parking farther away from stores. Ask your healthcare provider about the best exercise plan for you.      © Copyright [employer] 2018 Information is for End User's use only and may not be sold, redistributed or otherwise used for commercial purposes. All illustrations and images included in CareNotes® are the copyrighted property of [employer] or [employer]

## 2024-04-16 NOTE — PROGRESS NOTES
Assessment and Plan:     Problem List Items Addressed This Visit       Benign neoplasm of unspecified adrenal gland    Hyperlipidemia    Relevant Orders    Comprehensive metabolic panel    Lipid panel    CBC (Includes Diff/Plt) (Refl)    Essential (primary) hypertension    Relevant Orders    CBC (Includes Diff/Plt) (Refl)    Obstructive sleep apnea of adult    Osteoarthritis    Medicare annual wellness visit, subsequent - Primary    Polyp of colon    Relevant Orders    CBC (Includes Diff/Plt) (Refl)    Posttraumatic stress disorder    Rheumatoid arthritis (HCC)    Spinal stenosis of lumbar region    Need for hepatitis C screening test    Relevant Orders    Hepatitis C Antibody     Other Visit Diagnoses       Screening for diabetes mellitus        Relevant Orders    Hemoglobin A1C    Personal history of colonic polyps        Relevant Orders    Ambulatory referral to Gastroenterology    Screening for prostate cancer        Relevant Orders    PSA, Total Screen    Disorder of iron metabolism, unspecified        Relevant Orders    Hemoglobin A1C             Preventive health issues were discussed with patient, and age appropriate screening tests were ordered as noted in patient's After Visit Summary.  Personalized health advice and appropriate referrals for health education or preventive services given if needed, as noted in patient's After Visit Summary.     History of Present Illness:     Patient presents for a Medicare Wellness Visit    -  colon  2021-polyp  [facility]   due  2023=  -ct lung  pulmonologist  11/2023  [facility]  [facility]  -cpap  [facility]     Gets 6 mo labs  with [facility]==due July 29.2024    Scheduling colonoscopy now-dr eubanks  [facility]       Patient Care Team:  Dave Clark DO as PCP - General (Internal Medicine)     Review of Systems:     Review of Systems   Constitutional:  Negative for activity change and fatigue.   HENT:  Negative for ear discharge, ear pain,  rhinorrhea and sore throat.    Eyes:  Negative for pain and visual disturbance.   Respiratory:  Negative for cough and shortness of breath.    Cardiovascular:  Negative for chest pain and leg swelling.   Gastrointestinal:  Negative for abdominal pain, constipation and diarrhea.   Endocrine: Negative for cold intolerance and polyuria.   Genitourinary:  Negative for flank pain and hematuria.   Musculoskeletal:  Negative for back pain and joint swelling.   Skin:  Negative for pallor and wound.   Neurological:  Negative for dizziness, seizures and speech difficulty.   Psychiatric/Behavioral:  Negative for confusion and hallucinations.         Problem List:     Patient Active Problem List   Diagnosis    Benign neoplasm of unspecified adrenal gland    Bradycardia    Hyperlipidemia    Essential (primary) hypertension    Lipoma    Obstructive sleep apnea of adult    Osteoarthritis    Medicare annual wellness visit, subsequent    Personal history of COVID-19    Polyp of colon    Posttraumatic stress disorder    Rheumatoid arthritis (HCC)    Spinal stenosis of lumbar region    Tobacco user    Exposure to potentially hazardous substance    Preop examination    Age-related nuclear cataract of both eyes    Need for hepatitis C screening test      Past Medical and Surgical History:     Past Medical History:   Diagnosis Date    Arthritis     Hypertension      History reviewed. No pertinent surgical history.   Family History:     Family History   Problem Relation Age of Onset    Heart disease Family     Diabetes Family       Social History:     Social History     Socioeconomic History    Marital status: /Civil Union     Spouse name: None    Number of children: None    Years of education: None    Highest education level: None   Occupational History    None   Tobacco Use    Smoking status: Former     Current packs/day: 0.00     Types: Cigarettes     Quit date:      Years since quittin.2     Passive exposure: Never     Smokeless tobacco: Never   Substance and Sexual Activity    Alcohol use: Yes     Alcohol/week: 1.0 standard drink of alcohol     Types: 1 Standard drinks or equivalent per week     Comment: 1-2 per day    Drug use: No    Sexual activity: None   Other Topics Concern    None   Social History Narrative    None     Social Determinants of Health     Financial Resource Strain: Not on file   Food Insecurity: Patient Declined (4/15/2024)    Hunger Vital Sign     Worried About Running Out of Food in the Last Year: Patient declined     Ran Out of Food in the Last Year: Patient declined   Transportation Needs: Unknown (4/15/2024)    PRAPARE - Transportation     Lack of Transportation (Medical): No     Lack of Transportation (Non-Medical): Patient declined   Physical Activity: Not on file   Stress: Not on file   Social Connections: Not on file   Intimate Partner Violence: Not on file   Housing Stability: Patient Declined (4/15/2024)    Housing Stability Vital Sign     Unable to Pay for Housing in the Last Year: Patient declined     Number of Places Lived in the Last Year: Not on file     Unstable Housing in the Last Year: Patient declined      Medications and Allergies:     Current Outpatient Medications   Medication Sig Dispense Refill    erythromycin (ILOTYCIN) ophthalmic ointment 1/4 A SMALL AMOUNT ON EYELID TWICE A DAY APPLY TO INCISION SITE TWICE A DAY FOR 6 DAYS.      amLODIPine (NORVASC) 5 mg tablet Take 5 mg by mouth 2 (two) times a day      aspirin (ECOTRIN LOW STRENGTH) 81 mg EC tablet Take 81 mg by mouth daily      aspirin (ECOTRIN LOW STRENGTH) 81 mg EC tablet Take 81 mg by mouth daily      atorvastatin (LIPITOR) 40 mg tablet Take 20 mg by mouth daily      carboxymethylcellulose (REFRESH PLUS) 0.5 % SOLN Apply to eye      Cholecalciferol 2000 units TBDP Take by mouth      Diclofenac Sodium (VOLTAREN) 1 % APPLY 2GM TO UPPER EXTREMITIES TOPICALLY FOUR TIMES A DAY AS NEEDED FOR PAIN AND INFLAMMATION **USE DOSING CARD**  (DO NOT EXCEED 16 GRAMS DAILY TO ANY AFFECTED JOINT OF THE LOWER EXTREMITIES. DO NOT EXCEED 8 GRAMS DAILY TO ANY AFFECTED JOINT OF THE UPPER EXTREMITIES. DO NOT EXCEED A TOTAL DOSE OF 32 GRAMS DAILY OVER ALL JOINTS)      fexofenadine (ALLEGRA) 180 MG tablet Take 180 mg by mouth daily      fluticasone (FLONASE) 50 mcg/act nasal spray into each nostril      hydroxychloroquine (PLAQUENIL) 200 mg tablet Take 1 tablet (200 mg total) by mouth in the morning 90 tablet 3    Multiple Vitamin (MULTIVITAMIN) tablet Take 1 tablet by mouth daily      sodium chloride (OCEAN) 0.65 % nasal spray 1 spray into each nostril as needed      sulfaSALAzine (AZULFIDINE) 500 mg tablet Take 3 tablets (1,500 mg total) by mouth 2 (two) times a day 540 tablet 3    Turmeric 500 MG CAPS Take 1,000 capsules by mouth 2 (two) times a day      valsartan (DIOVAN) 320 MG tablet        No current facility-administered medications for this visit.     No Known Allergies   Immunizations:     Immunization History   Administered Date(s) Administered    COVID-19 MODERNA VACC 0.5 ML IM 02/12/2021, 03/12/2021, 11/16/2021, 07/13/2022    COVID-19 Moderna Vac BIVALENT 12 Yr+ IM 0.5 ML 12/14/2022    COVID-19 Moderna mRNA Vaccine 12 Yr+ 50 mcg/0.5 mL (Spikevax) 11/26/2023    INFLUENZA 09/01/2023, 11/01/2023    Pneumococcal 01/05/2011    Pneumococcal Conjugate 13-Valent 02/11/2016    Pneumococcal Polysaccharide PPV23 01/05/2011, 08/30/2017    Tdap 04/07/2014    Zoster Vaccine Recombinant 03/26/2019, 09/24/2019      Health Maintenance:         Topic Date Due    Hepatitis C Screening  Never done    Colorectal Cancer Screening  Discontinued         Topic Date Due    COVID-19 Vaccine (7 - 2023-24 season) 01/21/2024      Medicare Screening Tests and Risk Assessments:     Last Medicare Wellness visit information reviewed, patient interviewed and updates made to the record today.      Health Risk Assessment:   Patient rates overall health as good. Patient feels that their  physical health rating is slightly better. Patient is very satisfied with their life. Eyesight was rated as same. Hearing was rated as same. Patient feels that their emotional and mental health rating is much better. Patients states they are never, rarely angry. Patient states they are never, rarely unusually tired/fatigued. Pain experienced in the last 7 days has been none. Patient states that he has experienced no weight loss or gain in last 6 months.     Fall Risk Screening:   In the past year, patient has experienced: no history of falling in past year      Home Safety:  Patient does not have trouble with stairs inside or outside of their home. Patient has working smoke alarms and has working carbon monoxide detector. Home safety hazards include: none.     Nutrition:   Current diet is Low Cholesterol.     Medications:   Patient is not currently taking any over-the-counter supplements. Patient is able to manage medications.     Activities of Daily Living (ADLs)/Instrumental Activities of Daily Living (IADLs):   Walk and transfer into and out of bed and chair?: Yes  Dress and groom yourself?: Yes    Bathe or shower yourself?: Yes    Feed yourself? Yes  Do your laundry/housekeeping?: Yes  Manage your money, pay your bills and track your expenses?: Yes  Make your own meals?: Yes    Do your own shopping?: Yes    Cognitive Screening:   Provider or family/friend/caregiver concerned regarding cognition?: No    PREVENTIVE SCREENINGS      Cardiovascular Screening:    General: Screening Not Indicated and History Lipid Disorder      Prostate Cancer Screening:    General: Screening Not Indicated      Abdominal Aortic Aneurysm (AAA) Screening:    Risk factors include: tobacco use        Lung Cancer Screening:     General: Screening Not Indicated    Screening, Brief Intervention, and Referral to Treatment (SBIRT)    Screening  Typical number of drinks in a day: 0  Typical number of drinks in a week: 7  Interpretation: Low risk  drinking behavior.    Single Item Drug Screening:  How often have you used an illegal drug (including marijuana) or a prescription medication for non-medical reasons in the past year? less than monthly    Single Item Drug Screen Score: 1  Interpretation: POSITIVE screen for possible drug use disorder    Drug Abuse Screening Test (DAST-10):  1) Have you used drugs other than those required for medical reasons? Yes    Other Counseling Topics:   Car/seat belt/driving safety.     No results found.     Physical Exam:     /80   Pulse 70   Temp (!) 97 °F (36.1 °C)   Resp 12   Ht 6' (1.829 m)   Wt 112 kg (246 lb)   SpO2 94%   BMI 33.36 kg/m²     Physical Exam  Constitutional:       General: He is not in acute distress.     Appearance: Normal appearance. He is not ill-appearing or toxic-appearing.   HENT:      Head: Normocephalic and atraumatic.      Right Ear: Tympanic membrane and external ear normal.      Left Ear: Tympanic membrane and external ear normal.      Nose: Nose normal.      Mouth/Throat:      Mouth: Mucous membranes are moist.      Pharynx: Oropharynx is clear.   Eyes:      General: No scleral icterus.        Right eye: No discharge.         Left eye: No discharge.      Extraocular Movements: Extraocular movements intact.      Conjunctiva/sclera: Conjunctivae normal.      Pupils: Pupils are equal, round, and reactive to light.   Neck:      Vascular: No carotid bruit.   Cardiovascular:      Rate and Rhythm: Normal rate and regular rhythm.      Pulses: Normal pulses.      Heart sounds: No murmur heard.     No friction rub. No gallop.   Pulmonary:      Effort: Pulmonary effort is normal.      Breath sounds: Normal breath sounds. No wheezing, rhonchi or rales.   Abdominal:      General: Bowel sounds are normal. There is no distension.      Palpations: Abdomen is soft. There is no mass.      Tenderness: There is no guarding or rebound.   Musculoskeletal:         General: No swelling.      Cervical  back: Normal range of motion and neck supple. No rigidity.      Right lower leg: No edema.      Left lower leg: No edema.   Lymphadenopathy:      Cervical: No cervical adenopathy.   Skin:     General: Skin is warm.      Capillary Refill: Capillary refill takes less than 2 seconds.      Coloration: Skin is not jaundiced.      Findings: No rash.   Neurological:      General: No focal deficit present.      Mental Status: He is alert and oriented to person, place, and time.      Cranial Nerves: No cranial nerve deficit.      Sensory: No sensory deficit.      Motor: No weakness.      Gait: Gait normal.      Deep Tendon Reflexes: Reflexes normal.   Psychiatric:         Mood and Affect: Mood normal.         Behavior: Behavior normal.         Judgment: Judgment normal.          Dave Clark DO

## 2024-05-16 PROBLEM — Z00.00 MEDICARE ANNUAL WELLNESS VISIT, SUBSEQUENT: Status: RESOLVED | Noted: 2023-10-30 | Resolved: 2024-05-16

## 2024-05-16 PROBLEM — Z11.59 NEED FOR HEPATITIS C SCREENING TEST: Status: RESOLVED | Noted: 2024-04-16 | Resolved: 2024-05-16

## 2024-05-23 ENCOUNTER — HOSPITAL ENCOUNTER (OUTPATIENT)
Dept: HOSPITAL 99 - GI | Age: 79
End: 2024-05-23
Payer: MEDICARE

## 2024-05-23 DIAGNOSIS — Z86.010: ICD-10-CM

## 2024-05-23 DIAGNOSIS — K64.8: ICD-10-CM

## 2024-05-23 DIAGNOSIS — D12.3: ICD-10-CM

## 2024-05-23 DIAGNOSIS — Z12.11: Primary | ICD-10-CM

## 2024-05-23 DIAGNOSIS — K57.30: ICD-10-CM

## 2024-05-23 PROCEDURE — 88305 TISSUE EXAM BY PATHOLOGIST: CPT

## 2024-05-23 PROCEDURE — 45385 COLONOSCOPY W/LESION REMOVAL: CPT

## 2024-07-19 LAB — HCV AB SER-ACNC: NON REACTIVE

## 2024-10-17 ENCOUNTER — OFFICE VISIT (OUTPATIENT)
Dept: INTERNAL MEDICINE CLINIC | Facility: CLINIC | Age: 79
End: 2024-10-17
Payer: MEDICARE

## 2024-10-17 VITALS
BODY MASS INDEX: 32.91 KG/M2 | WEIGHT: 243 LBS | HEART RATE: 78 BPM | SYSTOLIC BLOOD PRESSURE: 140 MMHG | DIASTOLIC BLOOD PRESSURE: 70 MMHG | TEMPERATURE: 97.8 F | HEIGHT: 72 IN

## 2024-10-17 DIAGNOSIS — E66.811 CLASS 1 OBESITY WITH SERIOUS COMORBIDITY AND BODY MASS INDEX (BMI) OF 32.0 TO 32.9 IN ADULT, UNSPECIFIED OBESITY TYPE: ICD-10-CM

## 2024-10-17 DIAGNOSIS — Z68.56 SEVERE OBESITY WITH BODY MASS INDEX (BMI) GREATER THAN OR EQUAL TO 140% OF 95TH PERCENTILE FOR AGE IN PEDIATRIC PATIENT, UNSPECIFIED OBESITY TYPE, UNSPECIFIED WHETHER SERIOUS COMORBIDITY PRESENT (HCC): ICD-10-CM

## 2024-10-17 DIAGNOSIS — G47.33 OBSTRUCTIVE SLEEP APNEA OF ADULT: ICD-10-CM

## 2024-10-17 DIAGNOSIS — I10 ESSENTIAL (PRIMARY) HYPERTENSION: Primary | ICD-10-CM

## 2024-10-17 DIAGNOSIS — E66.01 SEVERE OBESITY WITH BODY MASS INDEX (BMI) GREATER THAN OR EQUAL TO 140% OF 95TH PERCENTILE FOR AGE IN PEDIATRIC PATIENT, UNSPECIFIED OBESITY TYPE, UNSPECIFIED WHETHER SERIOUS COMORBIDITY PRESENT (HCC): ICD-10-CM

## 2024-10-17 PROCEDURE — 99213 OFFICE O/P EST LOW 20 MIN: CPT | Performed by: INTERNAL MEDICINE

## 2024-10-17 PROCEDURE — G2211 COMPLEX E/M VISIT ADD ON: HCPCS | Performed by: INTERNAL MEDICINE

## 2024-10-17 RX ORDER — SPIRONOLACTONE 25 MG/1
12.5 TABLET ORAL
COMMUNITY
Start: 2024-07-23

## 2024-10-17 RX ORDER — TIRZEPATIDE 2.5 MG/.5ML
2.5 INJECTION, SOLUTION SUBCUTANEOUS WEEKLY
Qty: 2 ML | Refills: 0 | Status: SHIPPED | OUTPATIENT
Start: 2024-10-17 | End: 2024-11-14

## 2024-10-17 NOTE — PROGRESS NOTES
Assessment/Plan:    No problem-specific Assessment & Plan notes found for this encounter.       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 male.    6 mos, lipid and cbc results are in media tab  Dr caruso  started aldctone  7/22/2024===labs 10/2024  kcl  4.4  Labs 7/2024  [facility]        The following portions of the patient's history were reviewed and updated as appropriate: allergies, current medications, past family history, past medical history, past social history, past surgical history, and problem list.    Review of Systems   Constitutional:  Negative for activity change and fatigue.   HENT:  Negative for ear discharge, ear pain, rhinorrhea and sore throat.    Eyes:  Negative for pain and visual disturbance.   Respiratory:  Negative for cough and shortness of breath.    Cardiovascular:  Negative for chest pain and leg swelling.   Gastrointestinal:  Negative for abdominal pain, constipation and diarrhea.   Endocrine: Negative for cold intolerance and polyuria.   Genitourinary:  Negative for flank pain and hematuria.   Musculoskeletal:  Negative for back pain and joint swelling.   Skin:  Negative for pallor and wound.   Neurological:  Negative for dizziness, seizures and speech difficulty.   Psychiatric/Behavioral:  Negative for confusion and  hallucinations.          Objective:      /70   Pulse 78   Temp 97.8 °F (36.6 °C)   Ht 6' (1.829 m)   Wt 110 kg (243 lb)   BMI 32.96 kg/m²          Physical Exam  Vitals and nursing note reviewed.   Constitutional:       General: He is not in acute distress.     Appearance: Normal appearance. He is not ill-appearing.   HENT:      Head: Normocephalic.      Right Ear: External ear normal. There is no impacted cerumen.      Left Ear: External ear normal. There is no impacted cerumen.      Nose: No congestion or rhinorrhea.      Mouth/Throat:      Pharynx: No posterior oropharyngeal erythema.   Eyes:      General: No scleral icterus.        Right eye: No discharge.         Left eye: No discharge.   Neck:      Vascular: No carotid bruit.   Cardiovascular:      Rate and Rhythm: Normal rate and regular rhythm.      Heart sounds: Normal heart sounds. No murmur heard.     No friction rub. No gallop.   Pulmonary:      Breath sounds: No wheezing or rhonchi.   Abdominal:      General: There is no distension.      Tenderness: There is no abdominal tenderness. There is no guarding.   Musculoskeletal:         General: No swelling.      Cervical back: No rigidity.      Right lower leg: No edema.      Left lower leg: No edema.   Lymphadenopathy:      Cervical: No cervical adenopathy.   Skin:     Coloration: Skin is not jaundiced.   Neurological:      Mental Status: He is alert.      Cranial Nerves: No cranial nerve deficit.      Motor: No weakness.      Coordination: Coordination normal.   Psychiatric:         Mood and Affect: Mood normal.

## 2024-11-04 ENCOUNTER — TELEPHONE (OUTPATIENT)
Age: 79
End: 2024-11-04

## 2024-11-04 ENCOUNTER — TELEPHONE (OUTPATIENT)
Dept: INTERNAL MEDICINE CLINIC | Facility: CLINIC | Age: 79
End: 2024-11-04

## 2024-11-04 NOTE — TELEPHONE ENCOUNTER
Сергей and made appt for Wednesday he could not come in until then, sent dr millan message requesting otc

## 2024-11-04 NOTE — TELEPHONE ENCOUNTER
Pt has diarrhea for 5 days , he made appt not until Wednesday due to his availabilty, he wants to know if he can take anything until appt?

## 2024-11-04 NOTE — TELEPHONE ENCOUNTER
Pt reports having diarrhea for the past 5 days straight. Pt would like to know If Dr Clark would like to see him or if he can take something over the counter for it. Please advise

## 2024-11-06 ENCOUNTER — HOSPITAL ENCOUNTER (OUTPATIENT)
Dept: CT IMAGING | Facility: HOSPITAL | Age: 79
Discharge: HOME | End: 2024-11-06
Payer: COMMERCIAL

## 2024-11-06 ENCOUNTER — OFFICE VISIT (OUTPATIENT)
Dept: INTERNAL MEDICINE CLINIC | Facility: CLINIC | Age: 79
End: 2024-11-06
Payer: MEDICARE

## 2024-11-06 VITALS
OXYGEN SATURATION: 98 % | HEART RATE: 74 BPM | DIASTOLIC BLOOD PRESSURE: 70 MMHG | HEIGHT: 72 IN | SYSTOLIC BLOOD PRESSURE: 120 MMHG | BODY MASS INDEX: 32.23 KG/M2 | RESPIRATION RATE: 12 BRPM | WEIGHT: 238 LBS

## 2024-11-06 DIAGNOSIS — R19.7 DIARRHEA, UNSPECIFIED TYPE: Primary | ICD-10-CM

## 2024-11-06 DIAGNOSIS — Z72.0 TOBACCO USE: ICD-10-CM

## 2024-11-06 PROCEDURE — G2211 COMPLEX E/M VISIT ADD ON: HCPCS | Performed by: INTERNAL MEDICINE

## 2024-11-06 PROCEDURE — 99213 OFFICE O/P EST LOW 20 MIN: CPT | Performed by: INTERNAL MEDICINE

## 2024-11-06 PROCEDURE — 71250 CT THORAX DX C-: CPT

## 2024-11-06 NOTE — PROGRESS NOTES
Assessment/Plan:    No problem-specific Assessment & Plan notes found for this encounter.       There are no diagnoses linked to this encounter.      Subjective:      Patient ID: Girma Treviño is a 79 y.o. male.    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    Review of Systems   Constitutional:  Negative for activity change, appetite change, chills, diaphoresis, fatigue and fever.   HENT:  Negative for congestion, facial swelling, hearing loss, mouth sores, rhinorrhea, sore throat, trouble swallowing and voice change.    Eyes:  Negative for photophobia and pain.   Respiratory:  Negative for apnea, cough, chest tightness, shortness of breath and stridor.    Cardiovascular:  Negative for chest pain, palpitations and leg swelling.   Gastrointestinal:  Positive for diarrhea. Negative for abdominal distention, abdominal pain, blood in stool and constipation.   Endocrine: Negative for cold intolerance and heat intolerance.   Genitourinary:  Negative for difficulty urinating, dysuria, flank pain, genital sores, hematuria and urgency.   Musculoskeletal:  Negative for arthralgias, back pain, gait problem, joint swelling and myalgias.   Skin:  Negative for rash and wound.   Allergic/Immunologic: Negative for environmental allergies, food allergies and immunocompromised state.   Neurological:  Negative for dizziness, tremors, seizures, syncope, facial asymmetry, speech  difficulty, weakness, light-headedness, numbness and headaches.   Hematological:  Negative for adenopathy. Does not bruise/bleed easily.   Psychiatric/Behavioral:  Negative for agitation, behavioral problems, hallucinations, self-injury, sleep disturbance and suicidal ideas.          Objective:      There were no vitals taken for this visit.         Physical Exam  Vitals and nursing note reviewed.   Constitutional:       General: He is not in acute distress.     Appearance: Normal appearance. He is not ill-appearing.   HENT:      Head: Normocephalic.      Right Ear: External ear normal. There is no impacted cerumen.      Left Ear: External ear normal. There is no impacted cerumen.      Nose: No congestion or rhinorrhea.      Mouth/Throat:      Pharynx: No posterior oropharyngeal erythema.   Eyes:      General: No scleral icterus.        Right eye: No discharge.         Left eye: No discharge.   Neck:      Vascular: No carotid bruit.   Cardiovascular:      Rate and Rhythm: Normal rate and regular rhythm.      Heart sounds: Normal heart sounds. No murmur heard.     No friction rub. No gallop.   Pulmonary:      Breath sounds: No wheezing or rhonchi.   Abdominal:      General: There is no distension.      Tenderness: There is no abdominal tenderness. There is no guarding.   Musculoskeletal:         General: No swelling.      Cervical back: No rigidity.      Right lower leg: No edema.      Left lower leg: No edema.   Lymphadenopathy:      Cervical: No cervical adenopathy.   Skin:     Coloration: Skin is not jaundiced.   Neurological:      Mental Status: He is alert.      Cranial Nerves: No cranial nerve deficit.      Motor: No weakness.      Coordination: Coordination normal.   Psychiatric:         Mood and Affect: Mood normal.

## 2024-11-11 ENCOUNTER — TELEPHONE (OUTPATIENT)
Age: 79
End: 2024-11-11

## 2024-11-11 DIAGNOSIS — R19.7 DIARRHEA, UNSPECIFIED TYPE: Primary | ICD-10-CM

## 2024-11-11 LAB
CAMPYLOBACTER STL CULT: NORMAL
Lab: NORMAL
SALM + SHIG STL CULT: NORMAL
SL AMB E COLI SHIGA TOXIN EIA: NEGATIVE

## 2024-11-11 NOTE — TELEPHONE ENCOUNTER
Chrisp and explained [facility] missed the pcr test but we could add the test, and also the o and p is pending, other test negative

## 2024-11-11 NOTE — TELEPHONE ENCOUNTER
Patient called to review lab results from 11/7/24.  He said he is still having diarrhea.    Confirmed [facility] pharmacy in [place].    Thank you

## 2024-11-11 NOTE — TELEPHONE ENCOUNTER
[facility] did stool culture only=not pcr panel  O& P pending  Repeat actual ordered test panel at [facility] if able(printed)

## 2024-11-14 NOTE — TELEPHONE ENCOUNTER
Patient called in to ask if all his stool tests were back. He said he did not know he was to go and have additional test as per conversation 11/11/24. Please clarify if this is needed with patient.    Current condition: He is having 6 loose stools on average in 24 hour period. Stomach cramping prior to moving bowels but, not constant. No N/V. No fever. He is eating and drinking well. He is taking Imodium per packaging directions. Please ask PCP to review. Patient aware PCP is not in office this am.

## 2024-11-15 LAB — O+P STL CONC: NORMAL

## 2024-11-15 NOTE — TELEPHONE ENCOUNTER
Please be advised patient called to follow up on the stool sample results, it looks like there are two results back, waiting on enteric results please advise

## 2024-11-18 ENCOUNTER — RESULTS FOLLOW-UP (OUTPATIENT)
Dept: INTERNAL MEDICINE CLINIC | Facility: CLINIC | Age: 79
End: 2024-11-18

## 2024-11-18 LAB
ADV 40+41 DNA STL QL NAA+NON-PROBE: NOT DETECTED
ASTRO TYP 1-8 RNA STL QL NAA+NON-PROBE: NOT DETECTED
C CAYETANENSIS DNA STL QL NAA+NON-PROBE: NOT DETECTED
C COLI+JEJ+UPSA DNA STL QL NAA+NON-PROBE: NOT DETECTED
C DIF TOX TCDA+TCDB STL QL NAA+NON-PROBE: NOT DETECTED
CRYPTOSP DNA STL QL NAA+NON-PROBE: NOT DETECTED
E COLI O157 DNA STL QL NAA+NON-PROBE: NORMAL
E HISTOLYT DNA STL QL NAA+NON-PROBE: NOT DETECTED
EAEC PAA PLAS AGGR+AATA ST NAA+NON-PRB: NOT DETECTED
EC STX1+STX2 GENES STL QL NAA+NON-PROBE: NOT DETECTED
EPEC EAE GENE STL QL NAA+NON-PROBE: NOT DETECTED
ETEC LTA+ST1A+ST1B TOX ST NAA+NON-PROBE: NOT DETECTED
G LAMBLIA DNA STL QL NAA+NON-PROBE: NOT DETECTED
NOROVIRUS GI+II RNA STL QL NAA+NON-PROBE: NOT DETECTED
P SHIGELLOIDES DNA STL QL NAA+NON-PROBE: NOT DETECTED
RVA RNA STL QL NAA+NON-PROBE: NOT DETECTED
S ENT+BONG DNA STL QL NAA+NON-PROBE: NOT DETECTED
SAPO I+II+IV+V RNA STL QL NAA+NON-PROBE: NOT DETECTED
SHIGELLA SP+EIEC IPAH ST NAA+NON-PROBE: NOT DETECTED
V CHOL+PARA+VUL DNA STL QL NAA+NON-PROBE: NOT DETECTED
V CHOLERAE DNA STL QL NAA+NON-PROBE: NOT DETECTED
Y ENTEROCOL DNA STL QL NAA+NON-PROBE: NOT DETECTED

## 2024-11-22 ENCOUNTER — TELEPHONE (OUTPATIENT)
Age: 79
End: 2024-11-22

## 2024-11-22 NOTE — TELEPHONE ENCOUNTER
Pt called in again regarding his diarrhea. He's frustrated as he's going into his fifth week with it. He wants to know what the next steps are since his testing came back negative. Please advise.

## 2024-11-26 ENCOUNTER — TELEPHONE (OUTPATIENT)
Dept: INTERNAL MEDICINE CLINIC | Facility: CLINIC | Age: 79
End: 2024-11-26

## 2024-11-26 NOTE — TELEPHONE ENCOUNTER
Dr. Clark, has anything showed up in Jasiel's test results??? He is locked out of his My Chart and we are unable to speak to anyone to get back into his my chart.  Jasiel has called your office 3 times and asked for call back from you. Jasiel has been having diarrhea issues for 5 weeks now. he has stopped taking the imodium and I did have him start taking metamucil tabs daily...about 4 to 6 tabs a day to help bulk up. It has helped some. he needs a call from you. His number [phone]. At this point he doesn't know whether he should contact Dr. Mejia the gastroenterologist who performed his last colonoscopy on May 23, 2024.  Thank you,  Khari Treviño

## 2024-11-26 NOTE — TELEPHONE ENCOUNTER
All tests back and  comprehensive eval shows no infectious etiology of diarrhea.  Therefore  must be functional or inflammatory cause.  Should see gi and keep rehydrated with gateraid

## 2024-11-29 NOTE — TELEPHONE ENCOUNTER
Patient called requesting ocpy of test results to be faxed over to Dr. Mejia office.      Please review.  Thank you

## 2025-04-02 ENCOUNTER — HOSPITAL ENCOUNTER (OUTPATIENT)
Dept: HOSPITAL 99 - GI | Age: 80
Discharge: HOME | End: 2025-04-02
Payer: MEDICARE

## 2025-04-02 DIAGNOSIS — K64.8: ICD-10-CM

## 2025-04-02 DIAGNOSIS — K57.30: ICD-10-CM

## 2025-04-02 DIAGNOSIS — K63.5: ICD-10-CM

## 2025-04-02 DIAGNOSIS — R19.7: Primary | ICD-10-CM

## 2025-04-02 DIAGNOSIS — K52.832: ICD-10-CM

## 2025-04-02 PROCEDURE — 45331 SIGMOIDOSCOPY AND BIOPSY: CPT

## 2025-04-02 PROCEDURE — 88305 TISSUE EXAM BY PATHOLOGIST: CPT

## 2025-04-04 ENCOUNTER — NURSE TRIAGE (OUTPATIENT)
Age: 80
End: 2025-04-04

## 2025-04-04 NOTE — TELEPHONE ENCOUNTER
"FOLLOW UP: No same day appointment seen in office today. Confirmed with office. Advised Pt to call his ENT now to see they can see him today in their office. If not, Pt to proceed to Urgent Care today. Pt agreeable with plan.     REASON FOR CONVERSATION: Ear Drainage    SYMPTOMS: see below    OTHER: Triage forwarded to office for PCP review.    DISPOSITION: See Today in Office/refer to ENT/UC Today      Reason for Disposition   Ear pain    Answer Assessment - Initial Assessment Questions  1. LOCATION: \"Which ear is involved?\"       Bilateral ears with tubes present  2. COLOR: \"What is the color of the discharge?\"       Yellow and sticky, soaking through cotton ball over night  3. CONSISTENCY: \"How runny is the discharge? Could it be water?\"       Thick and sticky  4. ONSET: \"When did you first notice the discharge?\"      2 weeks  5. PAIN: \"Is there any earache?\" \"How bad is it?\"  (Scale 0-10; none, mild, moderate or severe)      Mild/moderate. Pt does not report sever pain  6. OBJECTS: \"Have you put anything in your ear?\" (e.g., Q-tip, other object)       Cotton balls overnight to help keep drainage off of pillow  7. OTHER SYMPTOMS: \"Do you have any other symptoms?\" (e.g., headache, fever, dizziness, vomiting, runny nose)      Does not report. Denies fever    Protocols used: Ear - Discharge-Adult-[place]    "

## 2025-04-23 ENCOUNTER — OFFICE VISIT (OUTPATIENT)
Dept: INTERNAL MEDICINE CLINIC | Facility: CLINIC | Age: 80
End: 2025-04-23
Payer: MEDICARE

## 2025-04-23 VITALS
WEIGHT: 236 LBS | SYSTOLIC BLOOD PRESSURE: 120 MMHG | DIASTOLIC BLOOD PRESSURE: 80 MMHG | HEIGHT: 72 IN | TEMPERATURE: 97.8 F | BODY MASS INDEX: 31.97 KG/M2 | HEART RATE: 50 BPM

## 2025-04-23 DIAGNOSIS — I10 ESSENTIAL (PRIMARY) HYPERTENSION: Primary | ICD-10-CM

## 2025-04-23 PROCEDURE — G2211 COMPLEX E/M VISIT ADD ON: HCPCS | Performed by: INTERNAL MEDICINE

## 2025-04-23 PROCEDURE — 99213 OFFICE O/P EST LOW 20 MIN: CPT | Performed by: INTERNAL MEDICINE

## 2025-04-23 RX ORDER — CIPROFLOXACIN AND DEXAMETHASONE 3; 1 MG/ML; MG/ML
SUSPENSION/ DROPS AURICULAR (OTIC)
COMMUNITY
Start: 2025-04-07

## 2025-04-23 RX ORDER — BUDESONIDE 3 MG/1
CAPSULE, COATED PELLETS ORAL
COMMUNITY
Start: 2025-04-07

## 2025-04-23 NOTE — PROGRESS NOTES
Assessment/Plan:    No problem-specific Assessment & Plan notes found for this encounter.       Diagnoses and all orders for this visit:    Essential (primary) hypertension  Comments:  - va  0v  1/7/2025  -labs 2025 reviewed    Other orders  -     budesonide (ENTOCORT EC) 3 MG capsule; PLEASE SEE ATTACHED FOR DETAILED DIRECTIONS  -     ciprofloxacin-dexamethasone (CIPRODEX) otic suspension; 5 DROPS EACH EAR TWICE A DAY          Subjective:      Patient ID: Girma Treviño is a 79 y.o. male.    Seen dr fraga fungus  ear  Ciprodex  to vosol?  Seen yesturday  dr flakito Mcnulty do--[facility] gi  sigmoid bx-pills steroid colitis bx--diarrhea resolved            The following portions of the patient's history were reviewed and updated as appropriate: allergies, current medications, past family history, past medical history, past social history, past surgical history, and problem list.    Review of Systems   Constitutional:  Negative for activity change, appetite change, chills, diaphoresis, fatigue and fever.   HENT:  Negative for congestion, facial swelling, hearing loss, mouth sores, rhinorrhea, sore throat, trouble swallowing and voice change.    Eyes:  Negative for photophobia and pain.   Respiratory:  Negative for apnea, cough, chest tightness, shortness of breath and stridor.    Cardiovascular:  Negative for chest pain, palpitations and leg swelling.   Gastrointestinal:  Negative for abdominal distention, abdominal pain, blood in stool and constipation.   Endocrine: Negative for cold intolerance and heat intolerance.   Genitourinary:  Negative for difficulty urinating, dysuria, flank pain, genital sores, hematuria and urgency.   Musculoskeletal:  Negative for arthralgias, back pain, gait problem, joint swelling and myalgias.   Skin:  Negative for rash and wound.   Allergic/Immunologic: Negative for environmental allergies, food allergies and immunocompromised state.   Neurological:  Negative for dizziness, tremors,  seizures, syncope, facial asymmetry, speech difficulty, weakness, light-headedness, numbness and headaches.   Hematological:  Negative for adenopathy. Does not bruise/bleed easily.   Psychiatric/Behavioral:  Negative for agitation, behavioral problems, hallucinations, self-injury, sleep disturbance and suicidal ideas.          Objective:      /80   Pulse 78   Temp 97.8 °F (36.6 °C)   Ht 6' (1.829 m)   Wt 107 kg (236 lb)   BMI 32.01 kg/m²          Physical Exam  Vitals and nursing note reviewed.   Constitutional:       General: He is not in acute distress.     Appearance: Normal appearance. He is not ill-appearing.   HENT:      Head: Normocephalic.      Right Ear: External ear normal. There is no impacted cerumen.      Left Ear: External ear normal. There is no impacted cerumen.      Nose: No congestion or rhinorrhea.      Mouth/Throat:      Pharynx: No posterior oropharyngeal erythema.   Eyes:      General: No scleral icterus.        Right eye: No discharge.         Left eye: No discharge.   Neck:      Vascular: No carotid bruit.   Cardiovascular:      Rate and Rhythm: Normal rate and regular rhythm.      Heart sounds: Normal heart sounds. No murmur heard.     No friction rub. No gallop.   Pulmonary:      Breath sounds: No wheezing or rhonchi.   Abdominal:      General: There is no distension.      Tenderness: There is no abdominal tenderness. There is no guarding.   Musculoskeletal:         General: No swelling.      Cervical back: No rigidity.      Right lower leg: No edema.      Left lower leg: No edema.   Lymphadenopathy:      Cervical: No cervical adenopathy.   Skin:     Coloration: Skin is not jaundiced.   Neurological:      Mental Status: He is alert.      Cranial Nerves: No cranial nerve deficit.      Motor: No weakness.      Coordination: Coordination normal.   Psychiatric:         Mood and Affect: Mood normal.